# Patient Record
Sex: MALE | Race: OTHER | NOT HISPANIC OR LATINO
[De-identification: names, ages, dates, MRNs, and addresses within clinical notes are randomized per-mention and may not be internally consistent; named-entity substitution may affect disease eponyms.]

---

## 2018-06-14 ENCOUNTER — APPOINTMENT (OUTPATIENT)
Dept: INFECTIOUS DISEASE | Facility: HOSPITAL | Age: 53
End: 2018-06-14
Payer: SELF-PAY

## 2018-06-14 DIAGNOSIS — Z71.89 OTHER SPECIFIED COUNSELING: ICD-10-CM

## 2018-06-14 PROCEDURE — 99401 PREV MED CNSL INDIV APPRX 15: CPT | Mod: 25

## 2018-06-14 PROCEDURE — 90471 IMMUNIZATION ADMIN: CPT | Mod: NC

## 2018-06-14 PROCEDURE — 90717 YELLOW FEVER VACCINE SUBQ: CPT

## 2021-01-21 ENCOUNTER — APPOINTMENT (OUTPATIENT)
Dept: UROLOGY | Facility: CLINIC | Age: 56
End: 2021-01-21
Payer: COMMERCIAL

## 2021-01-21 VITALS
WEIGHT: 165 LBS | HEIGHT: 72 IN | TEMPERATURE: 97.2 F | BODY MASS INDEX: 22.35 KG/M2 | SYSTOLIC BLOOD PRESSURE: 133 MMHG | HEART RATE: 80 BPM | DIASTOLIC BLOOD PRESSURE: 90 MMHG

## 2021-01-21 DIAGNOSIS — Z87.442 PERSONAL HISTORY OF URINARY CALCULI: ICD-10-CM

## 2021-01-21 DIAGNOSIS — Z78.9 OTHER SPECIFIED HEALTH STATUS: ICD-10-CM

## 2021-01-21 PROCEDURE — 99072 ADDL SUPL MATRL&STAF TM PHE: CPT

## 2021-01-21 PROCEDURE — 99204 OFFICE O/P NEW MOD 45 MIN: CPT

## 2021-01-21 NOTE — PHYSICAL EXAM
[General Appearance - Well Developed] : well developed [General Appearance - Well Nourished] : well nourished [Normal Appearance] : normal appearance [Well Groomed] : well groomed [General Appearance - In No Acute Distress] : no acute distress [Edema] : no peripheral edema [Respiration, Rhythm And Depth] : normal respiratory rhythm and effort [Exaggerated Use Of Accessory Muscles For Inspiration] : no accessory muscle use [Abdomen Soft] : soft [Abdomen Tenderness] : non-tender [Costovertebral Angle Tenderness] : no ~M costovertebral angle tenderness [Urethral Meatus] : meatus normal [Urinary Bladder Findings] : the bladder was normal on palpation [Scrotum] : the scrotum was normal [No Prostate Nodules] : no prostate nodules [Testes Mass (___cm)] : there were no testicular masses [Normal Station and Gait] : the gait and station were normal for the patient's age [No Focal Deficits] : no focal deficits [] : no rash [Oriented To Time, Place, And Person] : oriented to person, place, and time [Affect] : the affect was normal [Mood] : the mood was normal [Not Anxious] : not anxious [No Palpable Adenopathy] : no palpable adenopathy

## 2021-01-21 NOTE — ASSESSMENT
[FreeTextEntry1] : We discussed hematuria today and the multiple potential reasons for its presence. I discussed both benign and malignant etiologies that may explain hematuria. I've also reviewed the workup it is generally recommended for evaluation of hematuria with the purposes of ruling out malignancy or other urinary tract pathology that could warrant intervention.\par I've explained that cystoscopy would be recommended and the reasons for it as well as the risks of bleeding infection and damage to urethra\par A catscan with and without contrast was ordered and discussed with patient\par Pt will follow up to review the at scan and for the cystoscopy\par

## 2021-01-21 NOTE — HISTORY OF PRESENT ILLNESS
[FreeTextEntry1] : cc blood in urine \par 54 yo male referred for eval blood in urine \par last week has had multiple episodes of gross hematuria with clots \par no dysuria \par nonsmoker \par h/o stones \par no fam h/o gu ca\par \par ua and renal sono normal

## 2021-01-21 NOTE — REVIEW OF SYSTEMS
[Blood in urine that you can see] : blood visible in urine [Negative] : Heme/Lymph [FreeTextEntry2] : blood in urine clots

## 2021-01-28 LAB
PSA SERPL-MCNC: 1.29 NG/ML
URINE CYTOLOGY: NORMAL

## 2021-02-05 ENCOUNTER — APPOINTMENT (OUTPATIENT)
Dept: UROLOGY | Facility: CLINIC | Age: 56
End: 2021-02-05
Payer: COMMERCIAL

## 2021-02-05 DIAGNOSIS — R31.0 GROSS HEMATURIA: ICD-10-CM

## 2021-02-05 PROCEDURE — 99072 ADDL SUPL MATRL&STAF TM PHE: CPT

## 2021-02-05 PROCEDURE — 52000 CYSTOURETHROSCOPY: CPT

## 2021-02-23 PROBLEM — R31.0 GROSS HEMATURIA: Status: ACTIVE | Noted: 2021-01-21

## 2023-07-29 ENCOUNTER — NON-APPOINTMENT (OUTPATIENT)
Age: 58
End: 2023-07-29

## 2023-07-30 ENCOUNTER — INPATIENT (INPATIENT)
Facility: HOSPITAL | Age: 58
LOS: 0 days | Discharge: HOME | DRG: 247 | End: 2023-07-31
Attending: INTERNAL MEDICINE | Admitting: INTERNAL MEDICINE
Payer: COMMERCIAL

## 2023-07-30 VITALS
DIASTOLIC BLOOD PRESSURE: 90 MMHG | RESPIRATION RATE: 20 BRPM | SYSTOLIC BLOOD PRESSURE: 147 MMHG | HEART RATE: 70 BPM | OXYGEN SATURATION: 98 %

## 2023-07-30 DIAGNOSIS — I21.3 ST ELEVATION (STEMI) MYOCARDIAL INFARCTION OF UNSPECIFIED SITE: ICD-10-CM

## 2023-07-30 LAB
ALBUMIN SERPL ELPH-MCNC: 4.1 G/DL — SIGNIFICANT CHANGE UP (ref 3.3–5)
ALBUMIN SERPL ELPH-MCNC: 4.2 G/DL — SIGNIFICANT CHANGE UP (ref 3.3–5)
ALBUMIN SERPL ELPH-MCNC: 4.3 G/DL — SIGNIFICANT CHANGE UP (ref 3.3–5)
ALBUMIN SERPL ELPH-MCNC: 4.4 G/DL — SIGNIFICANT CHANGE UP (ref 3.3–5)
ALP SERPL-CCNC: 50 U/L — SIGNIFICANT CHANGE UP (ref 40–120)
ALP SERPL-CCNC: 50 U/L — SIGNIFICANT CHANGE UP (ref 40–120)
ALP SERPL-CCNC: 53 U/L — SIGNIFICANT CHANGE UP (ref 40–120)
ALP SERPL-CCNC: 54 U/L — SIGNIFICANT CHANGE UP (ref 40–120)
ALT FLD-CCNC: 36 U/L — SIGNIFICANT CHANGE UP (ref 10–45)
ALT FLD-CCNC: 78 U/L — HIGH (ref 10–45)
ALT FLD-CCNC: 88 U/L — HIGH (ref 10–45)
ALT FLD-CCNC: 97 U/L — HIGH (ref 10–45)
ANION GAP SERPL CALC-SCNC: 12 MMOL/L — SIGNIFICANT CHANGE UP (ref 5–17)
ANION GAP SERPL CALC-SCNC: 14 MMOL/L — SIGNIFICANT CHANGE UP (ref 5–17)
ANION GAP SERPL CALC-SCNC: 14 MMOL/L — SIGNIFICANT CHANGE UP (ref 5–17)
ANION GAP SERPL CALC-SCNC: 15 MMOL/L — SIGNIFICANT CHANGE UP (ref 5–17)
APTT BLD: 28.2 SEC — SIGNIFICANT CHANGE UP (ref 24.5–35.6)
APTT BLD: 31 SEC — SIGNIFICANT CHANGE UP (ref 24.5–35.6)
AST SERPL-CCNC: 31 U/L — SIGNIFICANT CHANGE UP (ref 10–40)
AST SERPL-CCNC: 322 U/L — HIGH (ref 10–40)
AST SERPL-CCNC: 402 U/L — HIGH (ref 10–40)
AST SERPL-CCNC: 459 U/L — HIGH (ref 10–40)
BASOPHILS # BLD AUTO: 0.04 K/UL — SIGNIFICANT CHANGE UP (ref 0–0.2)
BASOPHILS # BLD AUTO: 0.07 K/UL — SIGNIFICANT CHANGE UP (ref 0–0.2)
BASOPHILS NFR BLD AUTO: 0.3 % — SIGNIFICANT CHANGE UP (ref 0–2)
BASOPHILS NFR BLD AUTO: 0.9 % — SIGNIFICANT CHANGE UP (ref 0–2)
BILIRUB SERPL-MCNC: 0.4 MG/DL — SIGNIFICANT CHANGE UP (ref 0.2–1.2)
BILIRUB SERPL-MCNC: 0.5 MG/DL — SIGNIFICANT CHANGE UP (ref 0.2–1.2)
BILIRUB SERPL-MCNC: 0.7 MG/DL — SIGNIFICANT CHANGE UP (ref 0.2–1.2)
BILIRUB SERPL-MCNC: 1 MG/DL — SIGNIFICANT CHANGE UP (ref 0.2–1.2)
BLD GP AB SCN SERPL QL: NEGATIVE — SIGNIFICANT CHANGE UP
BUN SERPL-MCNC: 12 MG/DL — SIGNIFICANT CHANGE UP (ref 7–23)
BUN SERPL-MCNC: 9 MG/DL — SIGNIFICANT CHANGE UP (ref 7–23)
BURR CELLS BLD QL SMEAR: SLIGHT — SIGNIFICANT CHANGE UP
CALCIUM SERPL-MCNC: 8.6 MG/DL — SIGNIFICANT CHANGE UP (ref 8.4–10.5)
CALCIUM SERPL-MCNC: 8.9 MG/DL — SIGNIFICANT CHANGE UP (ref 8.4–10.5)
CALCIUM SERPL-MCNC: 9.1 MG/DL — SIGNIFICANT CHANGE UP (ref 8.4–10.5)
CALCIUM SERPL-MCNC: 9.3 MG/DL — SIGNIFICANT CHANGE UP (ref 8.4–10.5)
CHLORIDE SERPL-SCNC: 100 MMOL/L — SIGNIFICANT CHANGE UP (ref 96–108)
CHLORIDE SERPL-SCNC: 100 MMOL/L — SIGNIFICANT CHANGE UP (ref 96–108)
CHLORIDE SERPL-SCNC: 101 MMOL/L — SIGNIFICANT CHANGE UP (ref 96–108)
CHLORIDE SERPL-SCNC: 106 MMOL/L — SIGNIFICANT CHANGE UP (ref 96–108)
CK MB BLD-MCNC: 12.7 % — HIGH (ref 0–3.5)
CK MB CFR SERPL CALC: 19.2 NG/ML — HIGH (ref 0–6.7)
CK MB CFR SERPL CALC: 600 NG/ML — HIGH (ref 0–6.7)
CK SERPL-CCNC: 4726 U/L — HIGH (ref 30–200)
CO2 SERPL-SCNC: 20 MMOL/L — LOW (ref 22–31)
CO2 SERPL-SCNC: 21 MMOL/L — LOW (ref 22–31)
CO2 SERPL-SCNC: 21 MMOL/L — LOW (ref 22–31)
CO2 SERPL-SCNC: 22 MMOL/L — SIGNIFICANT CHANGE UP (ref 22–31)
CREAT SERPL-MCNC: 0.7 MG/DL — SIGNIFICANT CHANGE UP (ref 0.5–1.3)
CREAT SERPL-MCNC: 0.71 MG/DL — SIGNIFICANT CHANGE UP (ref 0.5–1.3)
CREAT SERPL-MCNC: 0.73 MG/DL — SIGNIFICANT CHANGE UP (ref 0.5–1.3)
CREAT SERPL-MCNC: 0.86 MG/DL — SIGNIFICANT CHANGE UP (ref 0.5–1.3)
EGFR: 100 ML/MIN/1.73M2 — SIGNIFICANT CHANGE UP
EGFR: 105 ML/MIN/1.73M2 — SIGNIFICANT CHANGE UP
EGFR: 106 ML/MIN/1.73M2 — SIGNIFICANT CHANGE UP
EGFR: 106 ML/MIN/1.73M2 — SIGNIFICANT CHANGE UP
EOSINOPHIL # BLD AUTO: 0.01 K/UL — SIGNIFICANT CHANGE UP (ref 0–0.5)
EOSINOPHIL # BLD AUTO: 0.22 K/UL — SIGNIFICANT CHANGE UP (ref 0–0.5)
EOSINOPHIL NFR BLD AUTO: 0.1 % — SIGNIFICANT CHANGE UP (ref 0–6)
EOSINOPHIL NFR BLD AUTO: 2.7 % — SIGNIFICANT CHANGE UP (ref 0–6)
GLUCOSE SERPL-MCNC: 107 MG/DL — HIGH (ref 70–99)
GLUCOSE SERPL-MCNC: 120 MG/DL — HIGH (ref 70–99)
GLUCOSE SERPL-MCNC: 128 MG/DL — HIGH (ref 70–99)
GLUCOSE SERPL-MCNC: 93 MG/DL — SIGNIFICANT CHANGE UP (ref 70–99)
HCT VFR BLD CALC: 46.5 % — SIGNIFICANT CHANGE UP (ref 39–50)
HCT VFR BLD CALC: 46.7 % — SIGNIFICANT CHANGE UP (ref 39–50)
HGB BLD-MCNC: 15.4 G/DL — SIGNIFICANT CHANGE UP (ref 13–17)
HGB BLD-MCNC: 15.7 G/DL — SIGNIFICANT CHANGE UP (ref 13–17)
IMM GRANULOCYTES NFR BLD AUTO: 0.4 % — SIGNIFICANT CHANGE UP (ref 0–0.9)
INR BLD: 1.06 RATIO — SIGNIFICANT CHANGE UP (ref 0.85–1.18)
INR BLD: 1.07 RATIO — SIGNIFICANT CHANGE UP (ref 0.85–1.18)
LYMPHOCYTES # BLD AUTO: 1.63 K/UL — SIGNIFICANT CHANGE UP (ref 1–3.3)
LYMPHOCYTES # BLD AUTO: 14 % — SIGNIFICANT CHANGE UP (ref 13–44)
LYMPHOCYTES # BLD AUTO: 2.08 K/UL — SIGNIFICANT CHANGE UP (ref 1–3.3)
LYMPHOCYTES # BLD AUTO: 25.9 % — SIGNIFICANT CHANGE UP (ref 13–44)
MAGNESIUM SERPL-MCNC: 2 MG/DL — SIGNIFICANT CHANGE UP (ref 1.6–2.6)
MANUAL SMEAR VERIFICATION: SIGNIFICANT CHANGE UP
MCHC RBC-ENTMCNC: 28.9 PG — SIGNIFICANT CHANGE UP (ref 27–34)
MCHC RBC-ENTMCNC: 29 PG — SIGNIFICANT CHANGE UP (ref 27–34)
MCHC RBC-ENTMCNC: 33.1 GM/DL — SIGNIFICANT CHANGE UP (ref 32–36)
MCHC RBC-ENTMCNC: 33.6 GM/DL — SIGNIFICANT CHANGE UP (ref 32–36)
MCV RBC AUTO: 86.2 FL — SIGNIFICANT CHANGE UP (ref 80–100)
MCV RBC AUTO: 87.4 FL — SIGNIFICANT CHANGE UP (ref 80–100)
MONOCYTES # BLD AUTO: 0.67 K/UL — SIGNIFICANT CHANGE UP (ref 0–0.9)
MONOCYTES # BLD AUTO: 0.86 K/UL — SIGNIFICANT CHANGE UP (ref 0–0.9)
MONOCYTES NFR BLD AUTO: 10.7 % — SIGNIFICANT CHANGE UP (ref 2–14)
MONOCYTES NFR BLD AUTO: 5.7 % — SIGNIFICANT CHANGE UP (ref 2–14)
NEUTROPHILS # BLD AUTO: 4.8 K/UL — SIGNIFICANT CHANGE UP (ref 1.8–7.4)
NEUTROPHILS # BLD AUTO: 9.28 K/UL — HIGH (ref 1.8–7.4)
NEUTROPHILS NFR BLD AUTO: 59.8 % — SIGNIFICANT CHANGE UP (ref 43–77)
NEUTROPHILS NFR BLD AUTO: 79.5 % — HIGH (ref 43–77)
NRBC # BLD: 0 /100 WBCS — SIGNIFICANT CHANGE UP (ref 0–0)
NT-PROBNP SERPL-SCNC: <36 PG/ML — SIGNIFICANT CHANGE UP (ref 0–300)
PHOSPHATE SERPL-MCNC: 2 MG/DL — LOW (ref 2.5–4.5)
PHOSPHATE SERPL-MCNC: 2.6 MG/DL — SIGNIFICANT CHANGE UP (ref 2.5–4.5)
PHOSPHATE SERPL-MCNC: 2.7 MG/DL — SIGNIFICANT CHANGE UP (ref 2.5–4.5)
PLAT MORPH BLD: NORMAL — SIGNIFICANT CHANGE UP
PLATELET # BLD AUTO: 261 K/UL — SIGNIFICANT CHANGE UP (ref 150–400)
PLATELET # BLD AUTO: 271 K/UL — SIGNIFICANT CHANGE UP (ref 150–400)
POTASSIUM SERPL-MCNC: 3.8 MMOL/L — SIGNIFICANT CHANGE UP (ref 3.5–5.3)
POTASSIUM SERPL-MCNC: 4.3 MMOL/L — SIGNIFICANT CHANGE UP (ref 3.5–5.3)
POTASSIUM SERPL-MCNC: 4.4 MMOL/L — SIGNIFICANT CHANGE UP (ref 3.5–5.3)
POTASSIUM SERPL-MCNC: 5.2 MMOL/L — SIGNIFICANT CHANGE UP (ref 3.5–5.3)
POTASSIUM SERPL-SCNC: 3.8 MMOL/L — SIGNIFICANT CHANGE UP (ref 3.5–5.3)
POTASSIUM SERPL-SCNC: 4.3 MMOL/L — SIGNIFICANT CHANGE UP (ref 3.5–5.3)
POTASSIUM SERPL-SCNC: 4.4 MMOL/L — SIGNIFICANT CHANGE UP (ref 3.5–5.3)
POTASSIUM SERPL-SCNC: 5.2 MMOL/L — SIGNIFICANT CHANGE UP (ref 3.5–5.3)
PROT SERPL-MCNC: 7.1 G/DL — SIGNIFICANT CHANGE UP (ref 6–8.3)
PROT SERPL-MCNC: 7.2 G/DL — SIGNIFICANT CHANGE UP (ref 6–8.3)
PROT SERPL-MCNC: 7.2 G/DL — SIGNIFICANT CHANGE UP (ref 6–8.3)
PROT SERPL-MCNC: 7.3 G/DL — SIGNIFICANT CHANGE UP (ref 6–8.3)
PROTHROM AB SERPL-ACNC: 11.1 SEC — SIGNIFICANT CHANGE UP (ref 9.5–13)
PROTHROM AB SERPL-ACNC: 11.2 SEC — SIGNIFICANT CHANGE UP (ref 9.5–13)
RBC # BLD: 5.32 M/UL — SIGNIFICANT CHANGE UP (ref 4.2–5.8)
RBC # BLD: 5.42 M/UL — SIGNIFICANT CHANGE UP (ref 4.2–5.8)
RBC # FLD: 14.5 % — SIGNIFICANT CHANGE UP (ref 10.3–14.5)
RBC # FLD: 14.6 % — HIGH (ref 10.3–14.5)
RBC BLD AUTO: ABNORMAL
RH IG SCN BLD-IMP: POSITIVE — SIGNIFICANT CHANGE UP
SODIUM SERPL-SCNC: 135 MMOL/L — SIGNIFICANT CHANGE UP (ref 135–145)
SODIUM SERPL-SCNC: 135 MMOL/L — SIGNIFICANT CHANGE UP (ref 135–145)
SODIUM SERPL-SCNC: 136 MMOL/L — SIGNIFICANT CHANGE UP (ref 135–145)
SODIUM SERPL-SCNC: 140 MMOL/L — SIGNIFICANT CHANGE UP (ref 135–145)
TARGETS BLD QL SMEAR: SLIGHT — SIGNIFICANT CHANGE UP
TROPONIN T, HIGH SENSITIVITY RESULT: 38 NG/L — SIGNIFICANT CHANGE UP (ref 0–51)
TROPONIN T, HIGH SENSITIVITY RESULT: 6542 NG/L — HIGH (ref 0–51)
WBC # BLD: 11.68 K/UL — HIGH (ref 3.8–10.5)
WBC # BLD: 8.02 K/UL — SIGNIFICANT CHANGE UP (ref 3.8–10.5)
WBC # FLD AUTO: 11.68 K/UL — HIGH (ref 3.8–10.5)
WBC # FLD AUTO: 8.02 K/UL — SIGNIFICANT CHANGE UP (ref 3.8–10.5)

## 2023-07-30 PROCEDURE — 92978 ENDOLUMINL IVUS OCT C 1ST: CPT | Mod: 26,LD

## 2023-07-30 PROCEDURE — 93010 ELECTROCARDIOGRAM REPORT: CPT

## 2023-07-30 PROCEDURE — 71045 X-RAY EXAM CHEST 1 VIEW: CPT | Mod: 26

## 2023-07-30 PROCEDURE — 99285 EMERGENCY DEPT VISIT HI MDM: CPT

## 2023-07-30 PROCEDURE — 99152 MOD SED SAME PHYS/QHP 5/>YRS: CPT

## 2023-07-30 PROCEDURE — 93458 L HRT ARTERY/VENTRICLE ANGIO: CPT | Mod: 26,59

## 2023-07-30 PROCEDURE — 92941 PRQ TRLML REVSC TOT OCCL AMI: CPT | Mod: LD

## 2023-07-30 PROCEDURE — 99291 CRITICAL CARE FIRST HOUR: CPT

## 2023-07-30 RX ORDER — HEPARIN SODIUM 5000 [USP'U]/ML
4000 INJECTION INTRAVENOUS; SUBCUTANEOUS ONCE
Refills: 0 | Status: COMPLETED | OUTPATIENT
Start: 2023-07-30 | End: 2023-07-30

## 2023-07-30 RX ORDER — ATORVASTATIN CALCIUM 80 MG/1
80 TABLET, FILM COATED ORAL AT BEDTIME
Refills: 0 | Status: DISCONTINUED | OUTPATIENT
Start: 2023-07-30 | End: 2023-07-31

## 2023-07-30 RX ORDER — ASPIRIN/CALCIUM CARB/MAGNESIUM 324 MG
81 TABLET ORAL DAILY
Refills: 0 | Status: DISCONTINUED | OUTPATIENT
Start: 2023-07-31 | End: 2023-07-31

## 2023-07-30 RX ORDER — TICAGRELOR 90 MG/1
180 TABLET ORAL ONCE
Refills: 0 | Status: COMPLETED | OUTPATIENT
Start: 2023-07-30 | End: 2023-07-30

## 2023-07-30 RX ORDER — ACETAMINOPHEN 500 MG
1000 TABLET ORAL ONCE
Refills: 0 | Status: COMPLETED | OUTPATIENT
Start: 2023-07-30 | End: 2023-07-30

## 2023-07-30 RX ORDER — METOPROLOL TARTRATE 50 MG
25 TABLET ORAL
Refills: 0 | Status: DISCONTINUED | OUTPATIENT
Start: 2023-07-31 | End: 2023-07-31

## 2023-07-30 RX ORDER — HEPARIN SODIUM 5000 [USP'U]/ML
INJECTION INTRAVENOUS; SUBCUTANEOUS
Qty: 25000 | Refills: 0 | Status: DISCONTINUED | OUTPATIENT
Start: 2023-07-30 | End: 2023-07-30

## 2023-07-30 RX ORDER — METOPROLOL TARTRATE 50 MG
12.5 TABLET ORAL ONCE
Refills: 0 | Status: COMPLETED | OUTPATIENT
Start: 2023-07-30 | End: 2023-07-30

## 2023-07-30 RX ORDER — AMIODARONE HYDROCHLORIDE 400 MG/1
150 TABLET ORAL ONCE
Refills: 0 | Status: COMPLETED | OUTPATIENT
Start: 2023-07-30 | End: 2023-07-30

## 2023-07-30 RX ORDER — CHLORHEXIDINE GLUCONATE 213 G/1000ML
1 SOLUTION TOPICAL
Refills: 0 | Status: DISCONTINUED | OUTPATIENT
Start: 2023-07-30 | End: 2023-07-31

## 2023-07-30 RX ORDER — TICAGRELOR 90 MG/1
90 TABLET ORAL ONCE
Refills: 0 | Status: COMPLETED | OUTPATIENT
Start: 2023-07-30 | End: 2023-07-30

## 2023-07-30 RX ORDER — METOPROLOL TARTRATE 50 MG
12.5 TABLET ORAL
Refills: 0 | Status: DISCONTINUED | OUTPATIENT
Start: 2023-07-30 | End: 2023-07-30

## 2023-07-30 RX ORDER — HEPARIN SODIUM 5000 [USP'U]/ML
4000 INJECTION INTRAVENOUS; SUBCUTANEOUS EVERY 6 HOURS
Refills: 0 | Status: DISCONTINUED | OUTPATIENT
Start: 2023-07-30 | End: 2023-07-30

## 2023-07-30 RX ORDER — TICAGRELOR 90 MG/1
90 TABLET ORAL EVERY 12 HOURS
Refills: 0 | Status: DISCONTINUED | OUTPATIENT
Start: 2023-07-30 | End: 2023-07-31

## 2023-07-30 RX ADMIN — Medication 12.5 MILLIGRAM(S): at 14:10

## 2023-07-30 RX ADMIN — ATORVASTATIN CALCIUM 80 MILLIGRAM(S): 80 TABLET, FILM COATED ORAL at 21:30

## 2023-07-30 RX ADMIN — TICAGRELOR 180 MILLIGRAM(S): 90 TABLET ORAL at 11:30

## 2023-07-30 RX ADMIN — Medication 1000 MILLIGRAM(S): at 14:45

## 2023-07-30 RX ADMIN — Medication 12.5 MILLIGRAM(S): at 15:47

## 2023-07-30 RX ADMIN — Medication 63.75 MILLIMOLE(S): at 23:06

## 2023-07-30 RX ADMIN — Medication 400 MILLIGRAM(S): at 14:16

## 2023-07-30 RX ADMIN — TICAGRELOR 90 MILLIGRAM(S): 90 TABLET ORAL at 19:55

## 2023-07-30 RX ADMIN — HEPARIN SODIUM 4000 UNIT(S): 5000 INJECTION INTRAVENOUS; SUBCUTANEOUS at 11:28

## 2023-07-30 RX ADMIN — HEPARIN SODIUM 950 UNIT(S)/HR: 5000 INJECTION INTRAVENOUS; SUBCUTANEOUS at 11:29

## 2023-07-30 RX ADMIN — TICAGRELOR 90 MILLIGRAM(S): 90 TABLET ORAL at 17:33

## 2023-07-30 RX ADMIN — AMIODARONE HYDROCHLORIDE 600 MILLIGRAM(S): 400 TABLET ORAL at 18:11

## 2023-07-30 NOTE — ED PROVIDER NOTE - ATTENDING CONTRIBUTION TO CARE
attending Em: 57yM no PMH presents as an EMS pre-notification for STEMI. Pt with chest pain x2 hours after playing tennis. Located across central chest, assoc with nausea and one episode of vomiting. Seen at urgent care with dynamic EKG changes, EKG with EMS concerning for anterior stemi. Given full dose ASA and 1 nitroglycerine by EMS. On arrival, repeat ekg performed, pt placed on tele, labs sent, cardiology consulted for urgent cath lab, pt given Brilinta and heparin as per ACS nomogram

## 2023-07-30 NOTE — H&P ADULT - NSHPREVIEWOFSYSTEMS_GEN_ALL_CORE
REVIEW OF SYSTEMS:    CONSTITUTIONAL: No weakness, fevers or chills  EYES/ENT: No visual changes;  No vertigo or throat pain   NECK: No pain or stiffness  RESPIRATORY: No cough, wheezing, hemoptysis; No shortness of breath  CARDIOVASCULAR: Chest pain improving, currently 2-3/10, See HPI No palpitations  GASTROINTESTINAL: See HPI No abdominal or epigastric pain. currently No nausea, vomiting, or hematemesis; No diarrhea or constipation. No melena or hematochezia.  GENITOURINARY: No dysuria, frequency or hematuria  NEUROLOGICAL: No numbness or weakness  SKIN: No itching, burning, rashes, or lesions   All other review of systems is negative unless indicated above.

## 2023-07-30 NOTE — H&P ADULT - HISTORY OF PRESENT ILLNESS
57M PMH HLD (no meds, last total chol 230s), presents with chest pain starting this morning around 9am. Patient states that he was playing tennis for 1.5 hours this morning, then went home, was nauseous with two episodes of non-bilious nonbloody vomiting. States the chest pain continued, described as burning sensation across left & right chest, rated 6/10, with radiation to both armpits, After discussing with his wife, he went to urgent care, was found to have MITRA on EKG, and was sent to the ED.    In ED was found with MITRA in inferior and precordial leads, was heparin, ASA, and brilinta loaded and taken for LHC    In cath lab found with 100% pLAD which patient received thrombectomy and DESx1

## 2023-07-30 NOTE — ED ADULT NURSE NOTE - CAS EDN DISCHARGE ASSESSMENT
Chest pain unchanged 4/10 since arrival/Alert and oriented to person, place and time/Patient baseline mental status

## 2023-07-30 NOTE — H&P ADULT - NSHPSOCIALHISTORY_GEN_ALL_CORE
Patient lives at home with wife, no issues with ADLs. 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 Denies any current or past drug use including marijuana and cocaine. Denies any current or past tobacco use. States drinks socially approx 1 monthly 1-2drinks.

## 2023-07-30 NOTE — PROGRESS NOTE ADULT - ASSESSMENT
====================ASSESSMENT ==============  57M PMH HLD p/w CP found with STEMI s/p LHC with JIMMIE x1 and thrombectomy to pLAD      Plan:  ====================== NEUROLOGY=====================  A&Ox3  - continue to monitor mental status as per protocol     ==================== RESPIRATORY======================  Comfortable on RA  - continue to monitor SpO2 with goal >94%    ====================CARDIOVASCULAR==================  AWSTEMI  - s/p LHC RRA 7/30: pLAD 100% s/p JIMMIE x1 with thrombectomy. LVEDP 30  - TTE ordered  - trend CE until peak  - ordered Tylenol for residual CP, continue to monitor for resolution   - start lopressor as freq AIVR on tele. HD stable and asymptomatic. Uptitrate as needed   - c/w DAPT: ASA and brilinta. Send Brilinta to pharmacy to verify insurance coverage and copayment   - start high intensity statin  - Consider ARB if tolerated for GDMT if SCr stable s.p cath   - monitor RRA access site       ===================HEMATOLOGIC/ONC ===================  CBC wnl   - Monitor H/H and plts  - DVT PPX:  start SQH 4hrs s/p radial band removal     ===================== RENAL =========================  SCr wnl   - Continue monitoring urine output, lytes, SCr/ BUN  - replete lytes prn with goal K >4 and Mg >2    ==================== GASTROINTESTINAL===================  DASH diet as tolerated  - monitor for regular BM while inpatient   =======================    ENDOCRINE  =====================  - send TSH, lipid, and hgb a1c    atorvastatin 80 milliGRAM(s) Oral at bedtime    ========================INFECTIOUS DISEASE================  Afebrile, no leukocytosis   - monitor and trend WBC and temperature curve        ====================ASSESSMENT ==============  57M PMH HLD p/w CP found with STEMI s/p LHC with JIMMIE x1 and thrombectomy to pLAD      Plan:  ====================== NEUROLOGY=====================  A&Ox3  - continue to monitor mental status as per protocol     ==================== RESPIRATORY======================  Comfortable on RA  - continue to monitor SpO2 with goal >94%    ====================CARDIOVASCULAR==================  AWSTEMI  - s/p LHC RRA 7/30: pLAD 100% s/p JIMMIE x1 with thrombectomy. LVEDP 30  - TTE ordered  - trend CE until peak  - ordered Tylenol for residual CP, continue to monitor for resolution   - start lopressor as freq AIVR on tele. HD stable and asymptomatic. Uptitrate as needed   - c/w DAPT: ASA and brilinta. Send Brilinta to pharmacy to verify insurance coverage and copayment   - start high intensity statin  - Consider ARB if tolerated for GDMT if SCr stable s.p cath   - monitor RRA access site       ===================HEMATOLOGIC/ONC ===================  CBC wnl   - Monitor H/H and plts  - DVT PPX:  start SQH 4hrs s/p radial band removal     ===================== RENAL =========================  SCr wnl   - Continue monitoring urine output, lytes, SCr/ BUN  - replete lytes prn with goal K >4 and Mg >2    ==================== GASTROINTESTINAL===================  DASH diet as tolerated  - monitor for regular BM while inpatient   =======================    ENDOCRINE  =====================  - send TSH, lipid, and hgb a1c    atorvastatin 80 milliGRAM(s) Oral at bedtime    ========================INFECTIOUS DISEASE================  Afebrile, no leukocytosis   - monitor and trend WBC and temperature curve       Patient requires continuous monitoring with bedside rhythm monitoring, pulse ox monitoring, and intermittent blood gas analysis. Care plan discussed with ICU care team. Patient remained critical and at risk for life threatening decompensation.  Patient seen, examined and plan discussed with CCU team during rounds.     I have personally provided 35 minutes of critical care time excluding time spent on separate procedures, in addition to initial critical care time provided by the CICU Attending, Dr. De Paz

## 2023-07-30 NOTE — H&P ADULT - NSHPPHYSICALEXAM_GEN_ALL_CORE
PHYSICAL EXAM:  Constitutional: Patient laying in bed, NAD  Head: Atraumatic, normocephalic  Eyes: No scleral icterus. PERRLA. EOMI  ENMT: Moist mucous membrane. Uvula midline  Neck: Supple, No JVD  Respiratory: CTA B/L. No wheezes, rales or rhonchi   Cardiovascular: Tachycardic S1/S2. No murmurs, rubs, or gallops.  Gastrointestinal: Nondistended, BSx4, soft, nontender.  Extremities: Moves all extremities. Warm, no edema, nontender  Vascular: 2+ DP/PT pulses B/L   Neurological: A&Ox3. Follows commands. No focal deficits.   Skin: No rashes  on exposed skin

## 2023-07-30 NOTE — ED PROVIDER NOTE - PHYSICAL EXAMINATION
PHYSICAL EXAM:  CONSTITUTIONAL: Uncomfortable appearing, pleasant, awake, alert, oriented to person, place, time/situation and in no apparent distress.  HEAD: Atraumatic, no step offs.  NECK: Supple, no meningismus.   EYES: Clear bilaterally, pupils equal, round and reactive to light.  ENMT: Airway patent, Nasal mucosa clear. Mouth with normal mucosa. Uvula is midline.   CARDIAC: Normal rate, regular rhythm. +S1/S2. No murmurs, rubs or gallops. 2+ pulses equal bilateral radial.  RESPIRATORY: Breathing unlabored. Breath sounds clear and equal bilaterally.  NEUROLOGICAL: Alert and oriented, no focal deficits, no motor or sensory deficits.   MSK: No clubbing, cyanosis, or edema. Full range of motion of all extremities.   SKIN: Skin warm and dry. No evidence of rashes or lesions.

## 2023-07-30 NOTE — ED ADULT NURSE NOTE - OBJECTIVE STATEMENT
Pt 49 y/o male, AxOX3, presents to ED via EMS from urgent care for ischemic changes on EKG. No pertinent PMH, not on AC. Pt reporting playing tennis this am, followed by episode of emesis and left sided chest burning. PT presented to urgent care, EKG was completed and pt prompted to be seen in ED. Pt received 324mg ASA, 1 SL Nitro in route. Arrived to ED awake, rating pain at 3/10. Pt is well appearing, speaking full sentences without difficulty. Breathing spontaneous and unlabored. Cardiology MD at bedside. Pt placed on continuous pulse ox and cardiac monitor, placed on pads. Safety and comfort measures initiated- bed placed in lowest position and side rails raised. Pt oriented to call bell system.

## 2023-07-30 NOTE — H&P ADULT - NSICDXFAMILYHX_GEN_ALL_CORE_FT
FAMILY HISTORY:  Father  Still living? No  Family history of CVA, Age at diagnosis: Age Unknown  FH: CAD (coronary artery disease), Age at diagnosis: Age Unknown    Sibling  Still living? Unknown  FH: HTN (hypertension), Age at diagnosis: Age Unknown

## 2023-07-30 NOTE — PROGRESS NOTE ADULT - SUBJECTIVE AND OBJECTIVE BOX
YESSICA GUILLEN  MRN-[id number]  Patient is a 57y old  Male who presents with a chief complaint of STEMI (30 Jul 2023 22:53)    HPI:  57M PMH HLD (no meds, last total chol 230s), presents with chest pain starting this morning around 9am. Patient states that he was playing tennis for 1.5 hours this morning, then went home, was nauseous with two episodes of non-bilious nonbloody vomiting. States the chest pain continued, described as burning sensation across left & right chest, rated 6/10, with radiation to both armpits, After discussing with his wife, he went to urgent care, was found to have MITRA on EKG, and was sent to the ED.    In ED was found with MITRA in inferior and precordial leads, was heparin, ASA, and brilinta loaded and taken for LHC    In cath lab found with 100% pLAD which patient received thrombectomy and DESx1  (30 Jul 2023 13:55)      24 HOUR EVENTS:  - EKG with resolved MITRA after cath    REVIEW OF SYSTEMS:  CONSTITUTIONAL: No weakness, fevers or chills  EYES/ENT: No visual changes;  No vertigo or throat pain   NECK: No pain or stiffness  RESPIRATORY: No cough, wheezing, hemoptysis; No shortness of breath  CARDIOVASCULAR: No chest pain or palpitations  GASTROINTESTINAL: No abdominal or epigastric pain. No nausea, vomiting, or hematemesis; No diarrhea or constipation. No melena or hematochezia.  GENITOURINARY: No dysuria, frequency or hematuria  NEUROLOGICAL: No numbness or weakness  SKIN: No itching, rashes      ICU Vital Signs Last 24 Hrs  T(C): 36.6 (31 Jul 2023 00:00), Max: 37.4 (30 Jul 2023 13:08)  T(F): 97.8 (31 Jul 2023 00:00), Max: 99.3 (30 Jul 2023 13:08)  HR: 80 (31 Jul 2023 00:00) (70 - 130)  BP: 122/72 (31 Jul 2023 00:00) (82/65 - 147/90)  BP(mean): 90 (31 Jul 2023 00:00) (70 - 99)  ABP: --  ABP(mean): --  RR: 26 (31 Jul 2023 00:00) (15 - 31)  SpO2: 97% (31 Jul 2023 00:00) (94% - 100%)    O2 Parameters below as of 31 Jul 2023 00:00  Patient On (Oxygen Delivery Method): room air            CVP(mm Hg): --  CO: --  CI: --  PA: --  PA(mean): --  PA(direct): --  PCWP: --  LA: --  RA: --  SVR: --  SVRI: --  PVR: --  PVRI: --  I&O's Summary    30 Jul 2023 07:01  -  31 Jul 2023 02:09  --------------------------------------------------------  IN: 557.5 mL / OUT: 1200 mL / NET: -642.5 mL        CAPILLARY BLOOD GLUCOSE    CAPILLARY BLOOD GLUCOSE          PHYSICAL EXAM:  GENERAL: No acute distress, well-developed  HEAD:  Atraumatic, Normocephalic  EYES: EOMI, PERRLA, conjunctiva and sclera clear  NECK: Supple, no lymphadenopathy, no JVD  CHEST/LUNG: CTAB; No wheezes, rales, or rhonchi  HEART: Regular rate and rhythm. Normal S1/S2. No murmurs, rubs, or gallops  ABDOMEN: Soft, non-tender, non-distended; normal bowel sounds, no organomegaly  EXTREMITIES:  2+ peripheral pulses b/l, No clubbing, cyanosis, or edema  NEUROLOGY: A&O x 3, no focal deficits  SKIN: No rashes or lesions    ============================I/O===========================   I&O's Detail    30 Jul 2023 07:01  -  31 Jul 2023 02:09  --------------------------------------------------------  IN:    IV PiggyBack: 287.5 mL    Oral Fluid: 270 mL  Total IN: 557.5 mL    OUT:    Emesis (mL): 300 mL    Voided (mL): 900 mL  Total OUT: 1200 mL    Total NET: -642.5 mL        ============================ LABS =========================                        15.7   11.68 )-----------( 261      ( 30 Jul 2023 17:03 )             46.7     07-30    135  |  100  |  9   ----------------------------<  120<H>  4.3   |  21<L>  |  0.70    Ca    9.1      30 Jul 2023 23:25  Phos  2.6     07-30  Mg     2.0     07-30    TPro  7.2  /  Alb  4.1  /  TBili  0.7  /  DBili  x   /  AST  459<H>  /  ALT  97<H>  /  AlkPhos  54  07-30    Troponin T, High Sensitivity Result: 7616 ng/L (07-30-23 @ 23:25)  Troponin T, High Sensitivity Result: 6542 ng/L (07-30-23 @ 17:03)  Troponin T, High Sensitivity Result: 38 ng/L (07-30-23 @ 11:27)    CKMB Units: 600.0 ng/mL (07-30-23 @ 23:25)  CKMB Units: 600.0 ng/mL (07-30-23 @ 17:03)  CKMB Units: 19.2 ng/mL (07-30-23 @ 11:27)    Creatine Kinase, Serum: 5687 U/L (07-30-23 @ 23:25)  Creatine Kinase, Serum: 4726 U/L (07-30-23 @ 17:03)    CPK Mass Assay %: 10.6 % (07-30-23 @ 23:25)  CPK Mass Assay %: 12.7 % (07-30-23 @ 17:03)        LIVER FUNCTIONS - ( 30 Jul 2023 23:25 )  Alb: 4.1 g/dL / Pro: 7.2 g/dL / ALK PHOS: 54 U/L / ALT: 97 U/L / AST: 459 U/L / GGT: x           PT/INR - ( 30 Jul 2023 17:03 )   PT: 11.1 sec;   INR: 1.06 ratio         PTT - ( 30 Jul 2023 17:03 )  PTT:31.0 sec      Urinalysis Basic - ( 30 Jul 2023 23:25 )    Color: x / Appearance: x / SG: x / pH: x  Gluc: 120 mg/dL / Ketone: x  / Bili: x / Urobili: x   Blood: x / Protein: x / Nitrite: x   Leuk Esterase: x / RBC: x / WBC x   Sq Epi: x / Non Sq Epi: x / Bacteria: x      ======================Micro/Rad/Cardio=================  Telemetry: Reviewed   EKG: Reviewed  CXR: Reviewed  Culture: Reviewed   Echo:   Cath:           YESSICA GUILLEN  MRN-[id number]  Patient is a 57y old  Male who presents with a chief complaint of STEMI (30 Jul 2023 22:53)    HPI:  57M PMH HLD (no meds, last total chol 230s), presents with chest pain starting this morning around 9am. Patient states that he was playing tennis for 1.5 hours this morning, then went home, was nauseous with two episodes of non-bilious nonbloody vomiting. States the chest pain continued, described as burning sensation across left & right chest, rated 6/10, with radiation to both armpits, After discussing with his wife, he went to urgent care, was found to have MITRA on EKG, and was sent to the ED.    In ED was found with MITRA in inferior and precordial leads, was heparin, ASA, and brilinta loaded and taken for LHC    In cath lab found with 100% pLAD which patient received thrombectomy and DESx1  (30 Jul 2023 13:55)      24 HOUR EVENTS:  - EKG with resolved MITRA after cath    REVIEW OF SYSTEMS:  CONSTITUTIONAL: No weakness, fevers or chills  EYES/ENT: No visual changes;  No vertigo or throat pain   NECK: No pain or stiffness  RESPIRATORY: No cough, wheezing, hemoptysis; No shortness of breath  CARDIOVASCULAR: No chest pain or palpitations  GASTROINTESTINAL: No abdominal or epigastric pain. No nausea, vomiting, or hematemesis; No diarrhea or constipation. No melena or hematochezia.  GENITOURINARY: No dysuria, frequency or hematuria  NEUROLOGICAL: No numbness or weakness  SKIN: No itching, rashes      ICU Vital Signs Last 24 Hrs  T(C): 36.6 (31 Jul 2023 00:00), Max: 37.4 (30 Jul 2023 13:08)  T(F): 97.8 (31 Jul 2023 00:00), Max: 99.3 (30 Jul 2023 13:08)  HR: 80 (31 Jul 2023 00:00) (70 - 130)  BP: 122/72 (31 Jul 2023 00:00) (82/65 - 147/90)  BP(mean): 90 (31 Jul 2023 00:00) (70 - 99)  ABP: --  ABP(mean): --  RR: 26 (31 Jul 2023 00:00) (15 - 31)  SpO2: 97% (31 Jul 2023 00:00) (94% - 100%)    O2 Parameters below as of 31 Jul 2023 00:00  Patient On (Oxygen Delivery Method): room air            CVP(mm Hg): --  CO: --  CI: --  PA: --  PA(mean): --  PA(direct): --  PCWP: --  LA: --  RA: --  SVR: --  SVRI: --  PVR: --  PVRI: --  I&O's Summary    30 Jul 2023 07:01  -  31 Jul 2023 02:09  --------------------------------------------------------  IN: 557.5 mL / OUT: 1200 mL / NET: -642.5 mL        CAPILLARY BLOOD GLUCOSE    CAPILLARY BLOOD GLUCOSE          PHYSICAL EXAM:  GENERAL: No acute distress, well-developed  HEAD:  Atraumatic, Normocephalic  EYES: EOMI, PERRLA, conjunctiva and sclera clear  NECK: Supple, no lymphadenopathy, no JVD  CHEST/LUNG: CTAB; No wheezes, rales, or rhonchi  HEART: Regular rate and rhythm. Normal S1/S2. No murmurs, rubs, or gallops  ABDOMEN: Soft, non-tender, non-distended; normal bowel sounds, no organomegaly  EXTREMITIES:  2+ peripheral pulses b/l, No clubbing, cyanosis, or edema. No evidence of hematoma at right radial site  NEUROLOGY: A&O x 3, no focal deficits  SKIN: No rashes or lesions    ============================I/O===========================   I&O's Detail    30 Jul 2023 07:01  -  31 Jul 2023 02:09  --------------------------------------------------------  IN:    IV PiggyBack: 287.5 mL    Oral Fluid: 270 mL  Total IN: 557.5 mL    OUT:    Emesis (mL): 300 mL    Voided (mL): 900 mL  Total OUT: 1200 mL    Total NET: -642.5 mL        ============================ LABS =========================                        15.7   11.68 )-----------( 261      ( 30 Jul 2023 17:03 )             46.7     07-30    135  |  100  |  9   ----------------------------<  120<H>  4.3   |  21<L>  |  0.70    Ca    9.1      30 Jul 2023 23:25  Phos  2.6     07-30  Mg     2.0     07-30    TPro  7.2  /  Alb  4.1  /  TBili  0.7  /  DBili  x   /  AST  459<H>  /  ALT  97<H>  /  AlkPhos  54  07-30    Troponin T, High Sensitivity Result: 7616 ng/L (07-30-23 @ 23:25)  Troponin T, High Sensitivity Result: 6542 ng/L (07-30-23 @ 17:03)  Troponin T, High Sensitivity Result: 38 ng/L (07-30-23 @ 11:27)    CKMB Units: 600.0 ng/mL (07-30-23 @ 23:25)  CKMB Units: 600.0 ng/mL (07-30-23 @ 17:03)  CKMB Units: 19.2 ng/mL (07-30-23 @ 11:27)    Creatine Kinase, Serum: 5687 U/L (07-30-23 @ 23:25)  Creatine Kinase, Serum: 4726 U/L (07-30-23 @ 17:03)    CPK Mass Assay %: 10.6 % (07-30-23 @ 23:25)  CPK Mass Assay %: 12.7 % (07-30-23 @ 17:03)        LIVER FUNCTIONS - ( 30 Jul 2023 23:25 )  Alb: 4.1 g/dL / Pro: 7.2 g/dL / ALK PHOS: 54 U/L / ALT: 97 U/L / AST: 459 U/L / GGT: x           PT/INR - ( 30 Jul 2023 17:03 )   PT: 11.1 sec;   INR: 1.06 ratio         PTT - ( 30 Jul 2023 17:03 )  PTT:31.0 sec      Urinalysis Basic - ( 30 Jul 2023 23:25 )    Color: x / Appearance: x / SG: x / pH: x  Gluc: 120 mg/dL / Ketone: x  / Bili: x / Urobili: x   Blood: x / Protein: x / Nitrite: x   Leuk Esterase: x / RBC: x / WBC x   Sq Epi: x / Non Sq Epi: x / Bacteria: x      ======================Micro/Rad/Cardio=================  Telemetry: Reviewed   EKG: Reviewed  CXR: Reviewed  Culture: Reviewed   Echo:   Cath:

## 2023-07-30 NOTE — H&P ADULT - ATTENDING COMMENTS
Anterior wall STEMI, now status post emergent PCI  Elevated troponin    Continue dual antiplatelet therapy  High intensity statin therapy  TTE pending  Guideline directed medical therapy

## 2023-07-30 NOTE — ED ADULT NURSE NOTE - NS ED NURSE TRANSPORT WITH
tele box, pads, RN ED TECH and Cardiology RN/Cardiac Monitor/Defib/ACLS/Rescue Kit/O2/BVM/IV pump/pulse ox/ACLS Rescue Kit

## 2023-07-30 NOTE — ED PROVIDER NOTE - OBJECTIVE STATEMENT
57-year-old male with no significant past medical history, presents as a STEMI notification.  Patient reports that he was playing tennis at 9 AM when he suddenly started having pain across his central chest, nausea, And 1 episode of emesis.  Patient had persistent pain, was picked up a wife and taken to urgent care which showed dynamic changes on EKG with elevations in anterior leads and depression in aVR.  Received 4 aspirin by EMS and 1 nitroglycerin with some improvement of symptoms.  Denies fever chills recent URI, shortness of breath, abdominal pain, diarrhea, numbness, tingling, weakness in extremities  Not on anticoagulation. Accompanied by wife. Non smoker, no alcohol use, no drugs. Has family history of cardiac disease with father who had MI at age 59.     PCP: Dr. Kraft.

## 2023-07-30 NOTE — PATIENT PROFILE ADULT - NSPROGENOTHERPROVIDER_GEN_A_NUR
Ora Gonzalez, 84 y.o., female arrived ambulatory to clinic at 1309 for Nplate injection. Patient assessed and vital signs stable. Administered Nplate SQ into R arm per provider order. Pt tolerated injection well. Ora Gonzalez verbalized understanding of plan of care and return to clinic. Discharged to [facility] at 1341 alert and ambulatory.   none

## 2023-07-30 NOTE — ED ADULT NURSE NOTE - NSFALLUNIVINTERV_ED_ALL_ED
Assistance with ambulation/Bed/Stretcher in lowest position, wheels locked, appropriate side rails in place/Call bell, personal items and telephone in reach/Instruct patient to call for assistance before getting out of bed/chair/stretcher/Non-slip footwear applied when patient is off stretcher/Reading to call system/Physically safe environment - no spills, clutter or unnecessary equipment/Purposeful proactive rounding/Room/bathroom lighting operational, light cord in reach

## 2023-07-30 NOTE — CHART NOTE - NSCHARTNOTEFT_GEN_A_CORE
Registration Time:  Initial ECG:  Called by ED:  Saw patient at bedside:  Called Cath Attending:  Balloon/device time:    Patient seen and evaluated at bedside    Reason for consult: STEMI    HPI:  57 No prior medical history, presents with two days of chest pain . pt states that he was playing tennis for 1.5 hours this morning, then went home, was nauseous, started having chest pain the is in the enter of his chest radiating to left arm, so he went to urgent care, was found to have MITRA on EKG, and was sent to the ED. Pt reports on going chest pain 4/10, burning in nature. No shortness of breath, lightheadedness, dizziness, orthopnea, PND, BYERS, or LE edema. He has no prior cardiac history, and does not smoke, His father had an MI in his 50's.       PMHx:   No pertinent past medical history        PSHx:   No significant past surgical history        Home Meds:    Current Meds:   heparin   Injectable 4000 Unit(s) IV Push every 6 hours PRN  heparin  Infusion.  Unit(s)/Hr IV Continuous <Continuous>      Allergies:  penicillin (Unknown)      FAMILY HISTORY:      Social History:  Smoking History:denies  Alcohol Use:social  Drug Use:denies    Review of Systems:  REVIEW OF SYSTEMS:  CONSTITUTIONAL: No weakness, fevers or chills  EYES/ENT: No visual changes;  No dysphagia  NECK: No pain or stiffness  RESPIRATORY: No cough, wheezing, hemoptysis; No shortness of breath  CARDIOVASCULAR: No chest pain or palpitations; No lower extremity edema  GASTROINTESTINAL: No abdominal or epigastric pain. No nausea, vomiting, or hematemesis; No diarrhea or constipation. No melena or hematochezia.  BACK: No back pain  GENITOURINARY: No dysuria, frequency or hematuria  NEUROLOGICAL: No numbness or weakness  SKIN: No itching, burning, rashes, or lesions   All other review of systems is negative unless indicated above.    Physical Exam:  T(F): 98 (07-30), Max: 98 (07-30)  HR: 72 (07-30) (70 - 72)  BP: 134/94 (07-30) (134/94 - 147/90)  RR: 18 (07-30)  SpO2: 99% (07-30)  GENERAL: No acute distress, well-developed  HEAD:  Atraumatic, Normocephalic  ENT: EOMI, PERRLA, conjunctiva and sclera clear, Neck supple, No JVD, moist mucosa  CHEST/LUNG: Clear to auscultation bilaterally; No wheeze, equal breath sounds bilaterally   BACK: No spinal tenderness  HEART: Regular rate and rhythm; No murmurs, rubs, or gallops  ABDOMEN: Soft, Nontender, Nondistended; Bowel sounds present  EXTREMITIES:  No clubbing, cyanosis, or edema  PSYCH: Nl behavior, nl affect  NEUROLOGY: AAOx3, non-focal, cranial nerves intact  SKIN: Normal color, No rashes or lesions  LINES:    Cardiovascular Diagnostic Testing:    ECG: Personally reviewed    Echo:    Stress Testing:    Cath:    Imaging:    Labs: Personally reviewed                        15.4   8.02  )-----------( 271      ( 30 Jul 2023 11:27 )             46.5           PT/INR - ( 30 Jul 2023 11:27 )   PT: 11.2 sec;   INR: 1.07 ratio         PTT - ( 30 Jul 2023 11:27 )  PTT:28.2 sec Registration Time:  Initial ECG: MITRA in V2-V6, II, III, aVF sub mm in I  Called by ED: 1115am  Saw patient at bedside:1118.  Called Cath Attendin  Balloon/device time:    Patient seen and evaluated at bedside    Reason for consult: STEMI    HPI:  57 No prior medical history, presents with two days of chest pain . pt states that he was playing tennis for 1.5 hours this morning, then went home, was nauseous, started having chest pain the is in the enter of his chest radiating to left arm, so he went to urgent care, was found to have MITRA on EKG, and was sent to the ED. Pt reports on going chest pain 4/10, burning in nature. No shortness of breath, lightheadedness, dizziness, orthopnea, PND, BYERS, or LE edema. He has no prior cardiac history, and does not smoke, His father had an MI in his 50's.       PMHx:   No pertinent past medical history        PSHx:   No significant past surgical history        Home Meds:    Current Meds:   heparin   Injectable 4000 Unit(s) IV Push every 6 hours PRN  heparin  Infusion.  Unit(s)/Hr IV Continuous <Continuous>      Allergies:  penicillin (Unknown)      FAMILY HISTORY:      Social History:  Smoking History:denies  Alcohol Use:social  Drug Use:denies    Review of Systems:  REVIEW OF SYSTEMS:  CONSTITUTIONAL: No weakness, fevers or chills  EYES/ENT: No visual changes;  No dysphagia  NECK: No pain or stiffness  RESPIRATORY: No cough, wheezing, hemoptysis; No shortness of breath  CARDIOVASCULAR: No chest pain or palpitations; No lower extremity edema  GASTROINTESTINAL: No abdominal or epigastric pain. No nausea, vomiting, or hematemesis; No diarrhea or constipation. No melena or hematochezia.  BACK: No back pain  GENITOURINARY: No dysuria, frequency or hematuria  NEUROLOGICAL: No numbness or weakness  SKIN: No itching, burning, rashes, or lesions   All other review of systems is negative unless indicated above.    Physical Exam:  T(F): 98 (-), Max: 98 ()  HR: 72 () (70 - 72)  BP: 134/94 () (134/94 - 147/90)  RR: 18 (-)  SpO2: 99% ()  GENERAL: No acute distress, well-developed  HEAD:  Atraumatic, Normocephalic  ENT: EOMI, PERRLA, conjunctiva and sclera clear, Neck supple, No JVD, moist mucosa  CHEST/LUNG: Clear to auscultation bilaterally; No wheeze, equal breath sounds bilaterally   BACK: No spinal tenderness  HEART: Regular rate and rhythm; No murmurs, rubs, or gallops  ABDOMEN: Soft, Nontender, Nondistended; Bowel sounds present  EXTREMITIES:  No clubbing, cyanosis, or edema  PSYCH: Nl behavior, nl affect  NEUROLOGY: AAOx3, non-focal, cranial nerves intact  SKIN: Normal color, No rashes or lesions  LINES:    Cardiovascular Diagnostic Testing:    ECG: Personally reviewed: sinus rhythm with leftward axis and MITRA in V2-V6, II, III, aVF sub mm in I        Labs: Personally reviewed                        15.4   8.02  )-----------( 271      ( 2023 11:27 )             46.5           PT/INR - ( 2023 11:27 )   PT: 11.2 sec;   INR: 1.07 ratio         PTT - ( 2023 11:27 )  PTT:28.2 sec

## 2023-07-30 NOTE — H&P ADULT - ASSESSMENT
====================ASSESSMENT ==============  57M PMH HLD p/w CP found with STEMI s/p LHC with JIMMIE x1 and thrombectomy to pLAD      Plan:  ====================== NEUROLOGY=====================  A&Ox3  - continue to monitor mental status as per protocol     ==================== RESPIRATORY======================  Comfortable on RA  - continue to monitor SpO2 with goal >94%    ====================CARDIOVASCULAR==================  AWSTEMI  - s/p LHC RRA 7/30: pLAD 100% s/p JIMMIE x1 with thrombectomy. LVEDP 30  - TTE ordered  - trend CE until peak  - ordered Tylenol for residual CP, continue to monitor for resolution   - start lopressor as freq AIVR on tele. HD stable and asymptomatic. Uptitrate as needed   - c/w DAPT: ASA and brilinta. Send Brilinta to pharmacy to verify insurance coverage and copayment   - start high intensity statin  - Consider ARB if tolerated for GDMT if SCr stable s.p cath   - monitor RRA access site       ===================HEMATOLOGIC/ONC ===================  CBC wnl   - Monitor H/H and plts  - DVT PPX:  start SQH 4hrs s/p radial band removal     ===================== RENAL =========================  SCr wnl   - Continue monitoring urine output, lytes, SCr/ BUN  - replete lytes prn with goal K >4 and Mg >2    ==================== GASTROINTESTINAL===================  DASH diet as tolerated  - monitor for regular BM while inpatient   =======================    ENDOCRINE  =====================  - send TSH, lipid, and hgb a1c    atorvastatin 80 milliGRAM(s) Oral at bedtime    ========================INFECTIOUS DISEASE================  Afebrile, no leukocytosis   - monitor and trend WBC and temperature curve         Patient requires continuous monitoring with bedside rhythm monitoring, arterial line, pulse oximetry, ventilator monitoring and intermittent blood gas analysis.  Care plan discussed with ICU care team.  Patient remained critical; required more than usual post op care; I have spent 35 minutes providing non-routine post op care, in addition to initial critical time provided by CICU attending Dr. De Paz, re-evaluated multiple times during the day.

## 2023-07-30 NOTE — ED ADULT TRIAGE NOTE - CCCP TRG CHIEF CMPLNT
Notify lab that they should ONLY do Citrate tube for patients with clumping regardless of how CBC is ordered.  This used to be lab policy in the past   chest pain

## 2023-07-31 ENCOUNTER — TRANSCRIPTION ENCOUNTER (OUTPATIENT)
Age: 58
End: 2023-07-31

## 2023-07-31 VITALS
RESPIRATION RATE: 22 BRPM | OXYGEN SATURATION: 99 % | SYSTOLIC BLOOD PRESSURE: 136 MMHG | DIASTOLIC BLOOD PRESSURE: 88 MMHG | HEART RATE: 77 BPM

## 2023-07-31 LAB
A1C WITH ESTIMATED AVERAGE GLUCOSE RESULT: 5.8 % — HIGH (ref 4–5.6)
ALBUMIN SERPL ELPH-MCNC: 4.4 G/DL — SIGNIFICANT CHANGE UP (ref 3.3–5)
ALP SERPL-CCNC: 52 U/L — SIGNIFICANT CHANGE UP (ref 40–120)
ALT FLD-CCNC: 106 U/L — HIGH (ref 10–45)
ANION GAP SERPL CALC-SCNC: 14 MMOL/L — SIGNIFICANT CHANGE UP (ref 5–17)
APTT BLD: 27.1 SEC — SIGNIFICANT CHANGE UP (ref 24.5–35.6)
AST SERPL-CCNC: 483 U/L — HIGH (ref 10–40)
BASOPHILS # BLD AUTO: 0.03 K/UL — SIGNIFICANT CHANGE UP (ref 0–0.2)
BASOPHILS NFR BLD AUTO: 0.3 % — SIGNIFICANT CHANGE UP (ref 0–2)
BILIRUB SERPL-MCNC: 0.8 MG/DL — SIGNIFICANT CHANGE UP (ref 0.2–1.2)
BLD GP AB SCN SERPL QL: NEGATIVE — SIGNIFICANT CHANGE UP
BUN SERPL-MCNC: 7 MG/DL — SIGNIFICANT CHANGE UP (ref 7–23)
CALCIUM SERPL-MCNC: 9.3 MG/DL — SIGNIFICANT CHANGE UP (ref 8.4–10.5)
CHLORIDE SERPL-SCNC: 99 MMOL/L — SIGNIFICANT CHANGE UP (ref 96–108)
CHOLEST SERPL-MCNC: 204 MG/DL — HIGH
CK MB BLD-MCNC: 10.1 % — HIGH (ref 0–3.5)
CK MB BLD-MCNC: 10.6 % — HIGH (ref 0–3.5)
CK MB BLD-MCNC: 7.7 % — HIGH (ref 0–3.5)
CK MB CFR SERPL CALC: 346.3 NG/ML — HIGH (ref 0–6.7)
CK MB CFR SERPL CALC: 544.8 NG/ML — HIGH (ref 0–6.7)
CK MB CFR SERPL CALC: 600 NG/ML — HIGH (ref 0–6.7)
CK SERPL-CCNC: 4478 U/L — HIGH (ref 30–200)
CK SERPL-CCNC: 5390 U/L — HIGH (ref 30–200)
CK SERPL-CCNC: 5687 U/L — HIGH (ref 30–200)
CO2 SERPL-SCNC: 21 MMOL/L — LOW (ref 22–31)
CREAT SERPL-MCNC: 0.74 MG/DL — SIGNIFICANT CHANGE UP (ref 0.5–1.3)
EGFR: 104 ML/MIN/1.73M2 — SIGNIFICANT CHANGE UP
EOSINOPHIL # BLD AUTO: 0.04 K/UL — SIGNIFICANT CHANGE UP (ref 0–0.5)
EOSINOPHIL NFR BLD AUTO: 0.4 % — SIGNIFICANT CHANGE UP (ref 0–6)
ESTIMATED AVERAGE GLUCOSE: 120 MG/DL — HIGH (ref 68–114)
GLUCOSE SERPL-MCNC: 144 MG/DL — HIGH (ref 70–99)
HCT VFR BLD CALC: 47.2 % — SIGNIFICANT CHANGE UP (ref 39–50)
HDLC SERPL-MCNC: 51 MG/DL — SIGNIFICANT CHANGE UP
HGB BLD-MCNC: 15.8 G/DL — SIGNIFICANT CHANGE UP (ref 13–17)
IMM GRANULOCYTES NFR BLD AUTO: 0.3 % — SIGNIFICANT CHANGE UP (ref 0–0.9)
LIPID PNL WITH DIRECT LDL SERPL: 138 MG/DL — HIGH
LYMPHOCYTES # BLD AUTO: 1.56 K/UL — SIGNIFICANT CHANGE UP (ref 1–3.3)
LYMPHOCYTES # BLD AUTO: 16.9 % — SIGNIFICANT CHANGE UP (ref 13–44)
MAGNESIUM SERPL-MCNC: 2 MG/DL — SIGNIFICANT CHANGE UP (ref 1.6–2.6)
MCHC RBC-ENTMCNC: 28.4 PG — SIGNIFICANT CHANGE UP (ref 27–34)
MCHC RBC-ENTMCNC: 33.5 GM/DL — SIGNIFICANT CHANGE UP (ref 32–36)
MCV RBC AUTO: 84.7 FL — SIGNIFICANT CHANGE UP (ref 80–100)
MONOCYTES # BLD AUTO: 0.72 K/UL — SIGNIFICANT CHANGE UP (ref 0–0.9)
MONOCYTES NFR BLD AUTO: 7.8 % — SIGNIFICANT CHANGE UP (ref 2–14)
NEUTROPHILS # BLD AUTO: 6.85 K/UL — SIGNIFICANT CHANGE UP (ref 1.8–7.4)
NEUTROPHILS NFR BLD AUTO: 74.3 % — SIGNIFICANT CHANGE UP (ref 43–77)
NON HDL CHOLESTEROL: 153 MG/DL — HIGH
NRBC # BLD: 0 /100 WBCS — SIGNIFICANT CHANGE UP (ref 0–0)
PHOSPHATE SERPL-MCNC: 2.7 MG/DL — SIGNIFICANT CHANGE UP (ref 2.5–4.5)
PLATELET # BLD AUTO: 256 K/UL — SIGNIFICANT CHANGE UP (ref 150–400)
POTASSIUM SERPL-MCNC: 4 MMOL/L — SIGNIFICANT CHANGE UP (ref 3.5–5.3)
POTASSIUM SERPL-SCNC: 4 MMOL/L — SIGNIFICANT CHANGE UP (ref 3.5–5.3)
PROT SERPL-MCNC: 7.2 G/DL — SIGNIFICANT CHANGE UP (ref 6–8.3)
RBC # BLD: 5.57 M/UL — SIGNIFICANT CHANGE UP (ref 4.2–5.8)
RBC # FLD: 14.4 % — SIGNIFICANT CHANGE UP (ref 10.3–14.5)
RH IG SCN BLD-IMP: POSITIVE — SIGNIFICANT CHANGE UP
SODIUM SERPL-SCNC: 134 MMOL/L — LOW (ref 135–145)
TRIGL SERPL-MCNC: 85 MG/DL — SIGNIFICANT CHANGE UP
TROPONIN T, HIGH SENSITIVITY RESULT: 7333 NG/L — HIGH (ref 0–51)
TROPONIN T, HIGH SENSITIVITY RESULT: 7616 NG/L — HIGH (ref 0–51)
TROPONIN T, HIGH SENSITIVITY RESULT: 8158 NG/L — HIGH (ref 0–51)
TSH SERPL-MCNC: 1 UIU/ML — SIGNIFICANT CHANGE UP (ref 0.27–4.2)
WBC # BLD: 9.23 K/UL — SIGNIFICANT CHANGE UP (ref 3.8–10.5)
WBC # FLD AUTO: 9.23 K/UL — SIGNIFICANT CHANGE UP (ref 3.8–10.5)

## 2023-07-31 PROCEDURE — 93306 TTE W/DOPPLER COMPLETE: CPT | Mod: 26

## 2023-07-31 PROCEDURE — 86901 BLOOD TYPING SEROLOGIC RH(D): CPT

## 2023-07-31 PROCEDURE — 83036 HEMOGLOBIN GLYCOSYLATED A1C: CPT

## 2023-07-31 PROCEDURE — 85025 COMPLETE CBC W/AUTO DIFF WBC: CPT

## 2023-07-31 PROCEDURE — C1874: CPT

## 2023-07-31 PROCEDURE — 82553 CREATINE MB FRACTION: CPT

## 2023-07-31 PROCEDURE — 84484 ASSAY OF TROPONIN QUANT: CPT

## 2023-07-31 PROCEDURE — 36415 COLL VENOUS BLD VENIPUNCTURE: CPT

## 2023-07-31 PROCEDURE — 85730 THROMBOPLASTIN TIME PARTIAL: CPT

## 2023-07-31 PROCEDURE — 93458 L HRT ARTERY/VENTRICLE ANGIO: CPT | Mod: 59

## 2023-07-31 PROCEDURE — 71045 X-RAY EXAM CHEST 1 VIEW: CPT

## 2023-07-31 PROCEDURE — 99239 HOSP IP/OBS DSCHRG MGMT >30: CPT | Mod: 25

## 2023-07-31 PROCEDURE — 86900 BLOOD TYPING SEROLOGIC ABO: CPT

## 2023-07-31 PROCEDURE — 80061 LIPID PANEL: CPT

## 2023-07-31 PROCEDURE — 82550 ASSAY OF CK (CPK): CPT

## 2023-07-31 PROCEDURE — 85610 PROTHROMBIN TIME: CPT

## 2023-07-31 PROCEDURE — 99285 EMERGENCY DEPT VISIT HI MDM: CPT

## 2023-07-31 PROCEDURE — 99231 SBSQ HOSP IP/OBS SF/LOW 25: CPT

## 2023-07-31 PROCEDURE — C1887: CPT

## 2023-07-31 PROCEDURE — 83880 ASSAY OF NATRIURETIC PEPTIDE: CPT

## 2023-07-31 PROCEDURE — C1753: CPT

## 2023-07-31 PROCEDURE — 85520 HEPARIN ASSAY: CPT

## 2023-07-31 PROCEDURE — 84100 ASSAY OF PHOSPHORUS: CPT

## 2023-07-31 PROCEDURE — C1769: CPT

## 2023-07-31 PROCEDURE — C9606: CPT | Mod: LD

## 2023-07-31 PROCEDURE — C8929: CPT

## 2023-07-31 PROCEDURE — C1894: CPT

## 2023-07-31 PROCEDURE — 83735 ASSAY OF MAGNESIUM: CPT

## 2023-07-31 PROCEDURE — 80053 COMPREHEN METABOLIC PANEL: CPT

## 2023-07-31 PROCEDURE — 96374 THER/PROPH/DIAG INJ IV PUSH: CPT

## 2023-07-31 PROCEDURE — 93010 ELECTROCARDIOGRAM REPORT: CPT

## 2023-07-31 PROCEDURE — 86850 RBC ANTIBODY SCREEN: CPT

## 2023-07-31 PROCEDURE — 93005 ELECTROCARDIOGRAM TRACING: CPT

## 2023-07-31 PROCEDURE — 92978 ENDOLUMINL IVUS OCT C 1ST: CPT | Mod: LD

## 2023-07-31 PROCEDURE — 84443 ASSAY THYROID STIM HORMONE: CPT

## 2023-07-31 RX ORDER — ATORVASTATIN CALCIUM 80 MG/1
1 TABLET, FILM COATED ORAL
Qty: 30 | Refills: 3
Start: 2023-07-31

## 2023-07-31 RX ORDER — HEPARIN SODIUM 5000 [USP'U]/ML
5000 INJECTION INTRAVENOUS; SUBCUTANEOUS EVERY 8 HOURS
Refills: 0 | Status: DISCONTINUED | OUTPATIENT
Start: 2023-07-31 | End: 2023-07-31

## 2023-07-31 RX ORDER — TICAGRELOR 90 MG/1
1 TABLET ORAL
Qty: 60 | Refills: 0
Start: 2023-07-31 | End: 2023-08-29

## 2023-07-31 RX ORDER — LOSARTAN POTASSIUM 100 MG/1
1 TABLET, FILM COATED ORAL
Qty: 30 | Refills: 3
Start: 2023-07-31

## 2023-07-31 RX ORDER — METOPROLOL TARTRATE 50 MG
1 TABLET ORAL
Qty: 60 | Refills: 3
Start: 2023-07-31 | End: 2023-11-27

## 2023-07-31 RX ORDER — ASPIRIN/CALCIUM CARB/MAGNESIUM 324 MG
1 TABLET ORAL
Qty: 30 | Refills: 3
Start: 2023-07-31

## 2023-07-31 RX ORDER — TICAGRELOR 90 MG/1
1 TABLET ORAL
Qty: 60 | Refills: 3
Start: 2023-07-31 | End: 2023-11-27

## 2023-07-31 RX ORDER — ASPIRIN/CALCIUM CARB/MAGNESIUM 324 MG
1 TABLET ORAL
Qty: 90 | Refills: 3
Start: 2023-07-31 | End: 2024-07-24

## 2023-07-31 RX ORDER — LOSARTAN POTASSIUM 100 MG/1
25 TABLET, FILM COATED ORAL DAILY
Refills: 0 | Status: DISCONTINUED | OUTPATIENT
Start: 2023-07-31 | End: 2023-07-31

## 2023-07-31 RX ORDER — ATORVASTATIN CALCIUM 80 MG/1
1 TABLET, FILM COATED ORAL
Qty: 90 | Refills: 1
Start: 2023-07-31 | End: 2024-01-26

## 2023-07-31 RX ORDER — METOPROLOL TARTRATE 50 MG
1 TABLET ORAL
Qty: 30 | Refills: 3
Start: 2023-07-31

## 2023-07-31 RX ADMIN — Medication 81 MILLIGRAM(S): at 11:59

## 2023-07-31 RX ADMIN — TICAGRELOR 90 MILLIGRAM(S): 90 TABLET ORAL at 05:41

## 2023-07-31 RX ADMIN — HEPARIN SODIUM 5000 UNIT(S): 5000 INJECTION INTRAVENOUS; SUBCUTANEOUS at 13:45

## 2023-07-31 RX ADMIN — CHLORHEXIDINE GLUCONATE 1 APPLICATION(S): 213 SOLUTION TOPICAL at 05:48

## 2023-07-31 RX ADMIN — Medication 25 MILLIGRAM(S): at 05:41

## 2023-07-31 RX ADMIN — LOSARTAN POTASSIUM 25 MILLIGRAM(S): 100 TABLET, FILM COATED ORAL at 12:02

## 2023-07-31 NOTE — PROGRESS NOTE ADULT - ASSESSMENT
Assessment and Plan  56 yo M with PMHx of HLD (no meds, last total chol 230s) presented to ER c/o 6/10 burning chest pain started 7/30 9am. Patient states that he was playing tennis for 1.5 hours then went home, felt nauseous with two episodes of non-bilious nonbloody vomiting, radiated to bilateral armpits. Pt went to urgent care, found to have MITRA on EKG, and was sent to the ED.  In ED, pt was found with MITRA in inferior and precordial leads. ASA and Brilinta loaded with Heparin gtt and taken for LHC.     In cath lab found with 100% pLAD which patient received thrombectomy and DESx1 via RRA access.     # STEMI s/p pLAD stent and thrombectomy  -Continue to monitor  -Continue ASA, Brilinta (Brilinta Rx sent to [facility])   -Continue statin  -Continue Metoprolol   -Other issues as per primary team    Pt's discharge education done   - Benefit and Risk of ASA/Brilinta, Activity Cath Access site care, f/u care, reportable signs ans symptoms   - outpatient follow up with Cardiologist in 1-2 weeks upon discharge  - Medication adherence stressed to patient with discussion of risks of non-compliance including death and stent thrombosis  - Recommend a heart healthy diet which includes a variety of fruits and vegetables, whole grains, low fat dairy products, legumes and skinless poultry and fish; food prepared with little or no salt and minimize processed foods  - Avoid using NSAIDs  (Aleve, Motrin, ibuprofen, naproxen) while on DAPT, please utilize Tylenol for pain control (not to exceed 4gm in 24 hours)  - pt understood and verbalized       RILEY Harmon-BC  #[id number] or team     Time spent on this encounter >25 minutes

## 2023-07-31 NOTE — PROGRESS NOTE ADULT - ATTENDING COMMENTS
Admitted with anterior wall STEMI s/p PCI  DAPT with ASA, Ticagrelor   Lipitor 80  Hemodynamics acceptable, chest pain free - continue beta blocker  Received a dose of Amio for frequent ventricular ectopy and NSVT overnight  Check TTE  Trend cardiac enzymes until peak  O2 sats mid to high 90s on nasal cannula - wean to room air  Normal renal function, compensated on exam - target even   H/H acceptable  Afebrile, no antibiotics   Sugars controlled, check Hgb A1c   No central access  OOB/ambulate Admitted with anterior wall STEMI s/p PCI  DAPT with ASA, Ticagrelor   Lipitor 80  Hemodynamics acceptable, chest pain free - continue beta blocker  Received a dose of Amio for frequent ventricular ectopy and NSVT overnight, now resolved - hold Amio  TTE with severely reduced LVEF - start Losartan  Cardiac enzymes have peaked  O2 sats mid 90s on room air  Normal renal function, compensated on exam - target even   H/H acceptable  Afebrile, no antibiotics   Sugars controlled  No central access  OOB/ambulate

## 2023-07-31 NOTE — DISCHARGE NOTE NURSING/CASE MANAGEMENT/SOCIAL WORK - NSDCPEFALRISK_GEN_ALL_CORE
For information on Fall & Injury Prevention, visit: [email or web address] OR  [email or web address] OR  https://www.cdc.gov/steadi/patient.html

## 2023-07-31 NOTE — DISCHARGE NOTE NURSING/CASE MANAGEMENT/SOCIAL WORK - NSDCPNINST_GEN_ALL_CORE
Take all medications as prescribed. Follow up with doctors as advised. Any change in your health condition kindly notify your doctor.

## 2023-07-31 NOTE — DISCHARGE NOTE PROVIDER - NSDCCPTREATMENT_GEN_ALL_CORE_FT
PRINCIPAL PROCEDURE  Procedure: Left heart cardiac cath  Findings and Treatment: 7/30  PCI RRA: JIMMIE x1 w/ thrombectomy to pLAD 100%

## 2023-07-31 NOTE — DISCHARGE NOTE PROVIDER - HOSPITAL COURSE
58 yo male with PMHx HLD p/w chest pain found with STEMI s/p LHC with PCI to pLAD via right radial artery. Patient was admitted to CICU for further management. AIVR noted on telemetry requiring Amio bolus x1. Echo significant for EF 34%, regional WMAs, no thrombus, borderline red RVSF. CICU course uneventful. Patient remains chest pain free.

## 2023-07-31 NOTE — PROGRESS NOTE ADULT - SUBJECTIVE AND OBJECTIVE BOX
YESSICA GUILLEN  MRN-[id number]  Patient is a 57y old  Male who presents with a chief complaint of STEMI (30 Jul 2023 22:53)    HPI:  57M PMH HLD (no meds, last total chol 230s), presents with chest pain starting this morning around 9am. Patient states that he was playing tennis for 1.5 hours this morning, then went home, was nauseous with two episodes of non-bilious nonbloody vomiting. States the chest pain continued, described as burning sensation across left & right chest, rated 6/10, with radiation to both armpits, After discussing with his wife, he went to urgent care, was found to have MITRA on EKG, and was sent to the ED.    In ED was found with MITRA in inferior and precordial leads, was heparin, ASA, and brilinta loaded and taken for LHC    In cath lab found with 100% pLAD which patient received thrombectomy and DESx1  (30 Jul 2023 13:55)      24 HOUR EVENTS:    REVIEW OF SYSTEMS:    CONSTITUTIONAL: No weakness, fevers or chills  EYES/ENT: No visual changes;  No vertigo or throat pain   NECK: No pain or stiffness  RESPIRATORY: No cough, wheezing, hemoptysis; No shortness of breath  CARDIOVASCULAR: No chest pain or palpitations  GASTROINTESTINAL: No abdominal or epigastric pain. No nausea, vomiting, or hematemesis; No diarrhea or constipation. No melena or hematochezia.  GENITOURINARY: No dysuria, frequency or hematuria  NEUROLOGICAL: No numbness or weakness  SKIN: No itching, rashes      ICU Vital Signs Last 24 Hrs  T(C): 36.5 (31 Jul 2023 04:00), Max: 37.4 (30 Jul 2023 13:08)  T(F): 97.7 (31 Jul 2023 04:00), Max: 99.3 (30 Jul 2023 13:08)  HR: 69 (31 Jul 2023 07:00) (69 - 130)  BP: 114/79 (31 Jul 2023 07:00) (82/65 - 147/90)  BP(mean): 91 (31 Jul 2023 07:00) (70 - 102)  ABP: --  ABP(mean): --  RR: 16 (31 Jul 2023 07:00) (14 - 31)  SpO2: 98% (31 Jul 2023 07:00) (94% - 100%)    O2 Parameters below as of 31 Jul 2023 07:00  Patient On (Oxygen Delivery Method): room air            CVP(mm Hg): --  CO: --  CI: --  PA: --  PA(mean): --  PA(direct): --  PCWP: --  LA: --  RA: --  SVR: --  SVRI: --  PVR: --  PVRI: --  I&O's Summary    30 Jul 2023 07:01  -  31 Jul 2023 07:00  --------------------------------------------------------  IN: 670 mL / OUT: 1890 mL / NET: -1220 mL        CAPILLARY BLOOD GLUCOSE    CAPILLARY BLOOD GLUCOSE          PHYSICAL EXAM:  GENERAL: No acute distress, well-developed  HEAD:  Atraumatic, Normocephalic  EYES: EOMI, PERRLA, conjunctiva and sclera clear  NECK: Supple, no lymphadenopathy, no JVD  CHEST/LUNG: CTAB; No wheezes, rales, or rhonchi  HEART: Regular rate and rhythm. Normal S1/S2. No murmurs, rubs, or gallops  ABDOMEN: Soft, non-tender, non-distended; normal bowel sounds, no organomegaly  EXTREMITIES:  2+ peripheral pulses b/l, No clubbing, cyanosis, or edema  NEUROLOGY: A&O x 3, no focal deficits  SKIN: No rashes or lesions    ============================I/O===========================   I&O's Detail    30 Jul 2023 07:01  -  31 Jul 2023 07:00  --------------------------------------------------------  IN:    IV PiggyBack: 350 mL    Oral Fluid: 320 mL  Total IN: 670 mL    OUT:    Emesis (mL): 300 mL    Voided (mL): 1590 mL  Total OUT: 1890 mL    Total NET: -1220 mL        ============================ LABS =========================                        15.8   9.23  )-----------( 256      ( 31 Jul 2023 04:53 )             47.2     07-31    134<L>  |  99  |  7   ----------------------------<  144<H>  4.0   |  21<L>  |  0.74    Ca    9.3      31 Jul 2023 04:51  Phos  2.7     07-31  Mg     2.0     07-31    TPro  7.2  /  Alb  4.4  /  TBili  0.8  /  DBili  x   /  AST  483<H>  /  ALT  106<H>  /  AlkPhos  52  07-31    Troponin T, High Sensitivity Result: 8158 ng/L (07-31-23 @ 04:51)  Troponin T, High Sensitivity Result: 7616 ng/L (07-30-23 @ 23:25)  Troponin T, High Sensitivity Result: 6542 ng/L (07-30-23 @ 17:03)  Troponin T, High Sensitivity Result: 38 ng/L (07-30-23 @ 11:27)    CKMB Units: 544.8 ng/mL (07-31-23 @ 04:51)  CKMB Units: 600.0 ng/mL (07-30-23 @ 23:25)  CKMB Units: 600.0 ng/mL (07-30-23 @ 17:03)  CKMB Units: 19.2 ng/mL (07-30-23 @ 11:27)    Creatine Kinase, Serum: 5390 U/L (07-31-23 @ 04:51)  Creatine Kinase, Serum: 5687 U/L (07-30-23 @ 23:25)  Creatine Kinase, Serum: 4726 U/L (07-30-23 @ 17:03)    CPK Mass Assay %: 10.1 % (07-31-23 @ 04:51)  CPK Mass Assay %: 10.6 % (07-30-23 @ 23:25)  CPK Mass Assay %: 12.7 % (07-30-23 @ 17:03)        LIVER FUNCTIONS - ( 31 Jul 2023 04:51 )  Alb: 4.4 g/dL / Pro: 7.2 g/dL / ALK PHOS: 52 U/L / ALT: 106 U/L / AST: 483 U/L / GGT: x           PT/INR - ( 30 Jul 2023 17:03 )   PT: 11.1 sec;   INR: 1.06 ratio         PTT - ( 31 Jul 2023 04:54 )  PTT:27.1 sec      Urinalysis Basic - ( 31 Jul 2023 04:51 )    Color: x / Appearance: x / SG: x / pH: x  Gluc: 144 mg/dL / Ketone: x  / Bili: x / Urobili: x   Blood: x / Protein: x / Nitrite: x   Leuk Esterase: x / RBC: x / WBC x   Sq Epi: x / Non Sq Epi: x / Bacteria: x      ======================Micro/Rad/Cardio=================  Telemetry: Reviewed   EKG: Reviewed  CXR: Reviewed  Culture: Reviewed   Echo:   Cath:   ======================================================  PAST MEDICAL & SURGICAL HISTORY:  Hyperlipemia      No significant past surgical history        ====================ASSESSMENT AND PLAN==============      ====================CARDIOVASCULAR==================    Mechanical Circulatory Support:  [ ] IABP   [ ] Impella 2.5   [ ] Impella CP  Settings:  Augmented Diastolic Pressure:  Impella Flow:     ==Hemodynamics==     (Date) CVP:      PCWP:        PA S/D:            Cardiac Output:             Cardiac Index:            SVR:    Preload (fluids, diuretics):  Afterload (anti-hypertensives, pressors):  Inotropes:    ==Pump==    TTE Date:  LVEF:                              Regional Wall Motion Abnormailities?:  [ ]Yes   [ ] No   If Yes, Details  Diastolic function:  RV function:   Any change from prior?: [ ] Yes   [ ] No   If Yes, Details:   Volume status:   [ ] Hypovolemia    [ ] Euvolemic    [ ] Hypervolemic    ==Coronaries==    Last ischemic workup:   Antiplatelet regimen:   Anticoagulant:   Statin:   Beta blocker:    ==Rhythm==    Current rhythm:  AM EKG Interpretation:   Anti-arrhythmic therapies:   TVP with settings:     metoprolol tartrate 25 milliGRAM(s) Oral two times a day      ====================== NEUROLOGY=====================  Sedation [ ]Yes   [ ] No   If Yes, Medication/Dose:   CAM ICU [ ] Positive  [ ] Negative      ==================== RESPIRATORY======================  [ ] Invasive Mechanical Ventilation   [ ] BIPAP    [ ] HFNC    [ ] NC   Non-invasive Mechanical Ventilation/ HFNC Settings:               ===================== RENAL =========================    07-30-23 @ 07:01  -  07-31-23 @ 07:00  --------------------------------------------------------  IN: 670 mL / OUT: 1890 mL / NET: -1220 mL      Renal Replacement Therapy:  [ ] CRRT      [ ] IHD   Last Session:    Fluid removal:     [ ] Diuretic therapy, Regimen:       ==================== GASTROINTESTINAL===================    Diet:  Last BM:   Indication for Stress Ulcer Prophylaxis, [ ] Yes    [ ] No   If Yes, Medication:       ========================INFECTIOUS DISEASE================  T(C): 36.5 (07-31-23 @ 04:00), Max: 37.4 (07-30-23 @ 13:08)  WBC Count: 9.23 K/uL (07-31-23 @ 04:53)  WBC Count: 11.68 K/uL (07-30-23 @ 17:03)  WBC Count: 8.02 K/uL (07-30-23 @ 11:27)        Current Antibiotics/Antifungals/ Antivirals with start date:       ===================HEMATOLOGIC/ONC ===================  Hemoglobin: 15.8 g/dL (07-31-23 @ 04:53)  Hemoglobin: 15.7 g/dL (07-30-23 @ 17:03)  Hemoglobin: 15.4 g/dL (07-30-23 @ 11:27)    Platelet Count - Automated: 256 K/uL (07-31-23 @ 04:53)  Platelet Count - Automated: 261 K/uL (07-30-23 @ 17:03)  Platelet Count - Automated: 271 K/uL (07-30-23 @ 11:27)    Chemical VTE Prophylaxis:  [ ] Lovenox    [ ] SQH   [ ]NA  Systemic Anticogaulation:  [ ] Yes    [ ] No,  If Yes, Medication and Indication:     aspirin enteric coated 81 milliGRAM(s) Oral daily  ticagrelor 90 milliGRAM(s) Oral every 12 hours    =======================    ENDOCRINE  =====================  Insulin drip  [ ] Yes    [ ] No  Basal Insulin [ ] Yes    [ ] No, Details:   Bolus insulin [ ] Yes    [ ] No  Sliding Scale  [ ] Yes    [ ] No          atorvastatin 80 milliGRAM(s) Oral at bedtime    ======================= LINES/TUBES  =====================  [place]: (Date placed)  Central Line: (Date placed)  HD Catheter: (Date placed)  Arterial Line: (Date placed)  Endotracheal Tube: (Date placed)  Xie: (Date placed)  ======================= DISPOSITION  =====================  [ ] Critical   [ ] Guarded    [ ] Stable    [ ] Maintain in CICU  [ ] Downgrade to [place]  [ ] Discharge Home    Patient requires continuous monitoring with bedside rhythm monitoring, pulse ox monitoring, and intermittent blood gas analysis. Care plan discussed with ICU care team. Patient remained critical and at risk for life threatening decompensation.  Patient seen, examined and plan discussed with CCU team during rounds.     I have personally provided ____ minutes of critical care time excluding time spent on separate procedures, in addition to initial critical care time provided by the CICU Attending, Dr. Shafer/ Elizabeth/ Yanely/ Baldev/ Zhane/ Penelope .       YESSICA GUILLEN  MRN-[id number]  Patient is a 57y old  Male who presents with a chief complaint of STEMI (30 Jul 2023 22:53)    HPI:  57M PMH HLD (no meds, last total chol 230s), presents with chest pain starting this morning around 9am. Patient states that he was playing tennis for 1.5 hours this morning, then went home, was nauseous with two episodes of non-bilious nonbloody vomiting. States the chest pain continued, described as burning sensation across left & right chest, rated 6/10, with radiation to both armpits, After discussing with his wife, he went to urgent care, was found to have MITRA on EKG, and was sent to the ED.    In ED was found with MITRA in inferior and precordial leads, was heparin, ASA, and brilinta loaded and taken for LHC    In cath lab found with 100% pLAD which patient received thrombectomy and DESx1  (30 Jul 2023 13:55)      24 HOUR EVENTS: AIVR frequently on telemetry, received 1 bolus of amiodarone.    REVIEW OF SYSTEMS:    CONSTITUTIONAL: No weakness, fevers or chills  EYES/ENT: No visual changes;  No vertigo or throat pain   NECK: No pain or stiffness  RESPIRATORY: No cough, wheezing, hemoptysis; No shortness of breath  CARDIOVASCULAR: +very mild chest pain 1/10, down from 3/10 yesterday; no palpitations  GASTROINTESTINAL: No abdominal or epigastric pain. No nausea, vomiting, or hematemesis; No diarrhea or constipation. No melena or hematochezia.  GENITOURINARY: No dysuria, frequency or hematuria  NEUROLOGICAL: No numbness or weakness  SKIN: No itching, rashes      ICU Vital Signs Last 24 Hrs  T(C): 36.5 (31 Jul 2023 04:00), Max: 37.4 (30 Jul 2023 13:08)  T(F): 97.7 (31 Jul 2023 04:00), Max: 99.3 (30 Jul 2023 13:08)  HR: 69 (31 Jul 2023 07:00) (69 - 130)  BP: 114/79 (31 Jul 2023 07:00) (82/65 - 147/90)  BP(mean): 91 (31 Jul 2023 07:00) (70 - 102)  ABP: --  ABP(mean): --  RR: 16 (31 Jul 2023 07:00) (14 - 31)  SpO2: 98% (31 Jul 2023 07:00) (94% - 100%)    O2 Parameters below as of 31 Jul 2023 07:00  Patient On (Oxygen Delivery Method): room air            CVP(mm Hg): --  CO: --  CI: --  PA: --  PA(mean): --  PA(direct): --  PCWP: --  LA: --  RA: --  SVR: --  SVRI: --  PVR: --  PVRI: --  I&O's Summary    30 Jul 2023 07:01  -  31 Jul 2023 07:00  --------------------------------------------------------  IN: 670 mL / OUT: 1890 mL / NET: -1220 mL        CAPILLARY BLOOD GLUCOSE    CAPILLARY BLOOD GLUCOSE          PHYSICAL EXAM:  GENERAL: No acute distress, well-developed  HEAD:  Atraumatic, Normocephalic  EYES: EOMI, PERRLA, conjunctiva and sclera clear  NECK: Supple, no lymphadenopathy, no JVD  CHEST/LUNG: CTAB; No wheezes, rales, or rhonchi  HEART: Regular rate and rhythm. Normal S1/S2. No murmurs, rubs, or gallops  ABDOMEN: Soft, non-tender, non-distended; normal bowel sounds, no organomegaly  EXTREMITIES:  2+ peripheral pulses b/l, No clubbing, cyanosis, or edema  NEUROLOGY: A&O x 3, no focal deficits  SKIN: No rashes or lesions    ============================I/O===========================   I&O's Detail    30 Jul 2023 07:01  -  31 Jul 2023 07:00  --------------------------------------------------------  IN:    IV PiggyBack: 350 mL    Oral Fluid: 320 mL  Total IN: 670 mL    OUT:    Emesis (mL): 300 mL    Voided (mL): 1590 mL  Total OUT: 1890 mL    Total NET: -1220 mL        ============================ LABS =========================                        15.8   9.23  )-----------( 256      ( 31 Jul 2023 04:53 )             47.2     07-31    134<L>  |  99  |  7   ----------------------------<  144<H>  4.0   |  21<L>  |  0.74    Ca    9.3      31 Jul 2023 04:51  Phos  2.7     07-31  Mg     2.0     07-31    TPro  7.2  /  Alb  4.4  /  TBili  0.8  /  DBili  x   /  AST  483<H>  /  ALT  106<H>  /  AlkPhos  52  07-31    Troponin T, High Sensitivity Result: 8158 ng/L (07-31-23 @ 04:51)  Troponin T, High Sensitivity Result: 7616 ng/L (07-30-23 @ 23:25)  Troponin T, High Sensitivity Result: 6542 ng/L (07-30-23 @ 17:03)  Troponin T, High Sensitivity Result: 38 ng/L (07-30-23 @ 11:27)    CKMB Units: 544.8 ng/mL (07-31-23 @ 04:51)  CKMB Units: 600.0 ng/mL (07-30-23 @ 23:25)  CKMB Units: 600.0 ng/mL (07-30-23 @ 17:03)  CKMB Units: 19.2 ng/mL (07-30-23 @ 11:27)    Creatine Kinase, Serum: 5390 U/L (07-31-23 @ 04:51)  Creatine Kinase, Serum: 5687 U/L (07-30-23 @ 23:25)  Creatine Kinase, Serum: 4726 U/L (07-30-23 @ 17:03)    CPK Mass Assay %: 10.1 % (07-31-23 @ 04:51)  CPK Mass Assay %: 10.6 % (07-30-23 @ 23:25)  CPK Mass Assay %: 12.7 % (07-30-23 @ 17:03)        LIVER FUNCTIONS - ( 31 Jul 2023 04:51 )  Alb: 4.4 g/dL / Pro: 7.2 g/dL / ALK PHOS: 52 U/L / ALT: 106 U/L / AST: 483 U/L / GGT: x           PT/INR - ( 30 Jul 2023 17:03 )   PT: 11.1 sec;   INR: 1.06 ratio         PTT - ( 31 Jul 2023 04:54 )  PTT:27.1 sec      Urinalysis Basic - ( 31 Jul 2023 04:51 )    Color: x / Appearance: x / SG: x / pH: x  Gluc: 144 mg/dL / Ketone: x  / Bili: x / Urobili: x   Blood: x / Protein: x / Nitrite: x   Leuk Esterase: x / RBC: x / WBC x   Sq Epi: x / Non Sq Epi: x / Bacteria: x      ======================Micro/Rad/Cardio=================  Telemetry: Reviewed   EKG: Reviewed  CXR: Reviewed  Culture: Reviewed   Echo:   Cath:   ======================================================  PAST MEDICAL & SURGICAL HISTORY:  Hyperlipemia      No significant past surgical history        ====================ASSESSMENT AND PLAN==============      ====================CARDIOVASCULAR==================    Mechanical Circulatory Support:  [ ] IABP   [ ] Impella 2.5   [ ] Impella CP  Settings:  Augmented Diastolic Pressure:  Impella Flow:     ==Hemodynamics==     (Date) CVP:      PCWP:        PA S/D:            Cardiac Output:             Cardiac Index:            SVR:    Preload (fluids, diuretics):  Afterload (anti-hypertensives, pressors):  Inotropes:    ==Pump==    TTE Date: 7/31/23  LVEF: 34%                             Regional Wall Motion Abnormailities?:  [x]Yes   [ ] No   If Yes, Details:  consistent with ischemic heart disease  Diastolic function: unchanged  RV function: Normal right ventricular cavity size, normal right ventricular wall thickness and borderline reduced right ventricular systolic function. The tricuspid annular plane systolic excursion (TAPSE) is 1.5 cm (normal >=1.7 cm).   6. There is trace tricuspid regurgitation. There is insufficient tricuspid regurgitation detected to calculate pulmonary artery systolic pressure.  Any change from prior?: [ ] Yes   [ ] No   If Yes, Details: (no prior heart echo at this hospital)  Volume status:   [ ] Hypovolemia    [x] Euvolemic    [ ] Hypervolemic    ==Coronaries==    Last ischemic workup:   Antiplatelet regimen:   Anticoagulant:   Statin:   Beta blocker:    ==Rhythm==    Current rhythm:  AM EKG Interpretation:   Anti-arrhythmic therapies:   TVP with settings:     metoprolol tartrate 25 milliGRAM(s) Oral two times a day      ====================== NEUROLOGY=====================  Sedation [ ]Yes   [ ] No   If Yes, Medication/Dose:   CAM ICU [ ] Positive  [ ] Negative      ==================== RESPIRATORY======================  [ ] Invasive Mechanical Ventilation   [ ] BIPAP    [ ] HFNC    [ ] NC   Non-invasive Mechanical Ventilation/ HFNC Settings:               ===================== RENAL =========================    07-30-23 @ 07:01  -  07-31-23 @ 07:00  --------------------------------------------------------  IN: 670 mL / OUT: 1890 mL / NET: -1220 mL      Renal Replacement Therapy:  [ ] CRRT      [ ] IHD   Last Session:    Fluid removal:     [ ] Diuretic therapy, Regimen:       ==================== GASTROINTESTINAL===================    Diet:  Last BM:   Indication for Stress Ulcer Prophylaxis, [ ] Yes    [ ] No   If Yes, Medication:       ========================INFECTIOUS DISEASE================  T(C): 36.5 (07-31-23 @ 04:00), Max: 37.4 (07-30-23 @ 13:08)  WBC Count: 9.23 K/uL (07-31-23 @ 04:53)  WBC Count: 11.68 K/uL (07-30-23 @ 17:03)  WBC Count: 8.02 K/uL (07-30-23 @ 11:27)        Current Antibiotics/Antifungals/ Antivirals with start date:       ===================HEMATOLOGIC/ONC ===================  Hemoglobin: 15.8 g/dL (07-31-23 @ 04:53)  Hemoglobin: 15.7 g/dL (07-30-23 @ 17:03)  Hemoglobin: 15.4 g/dL (07-30-23 @ 11:27)    Platelet Count - Automated: 256 K/uL (07-31-23 @ 04:53)  Platelet Count - Automated: 261 K/uL (07-30-23 @ 17:03)  Platelet Count - Automated: 271 K/uL (07-30-23 @ 11:27)    Chemical VTE Prophylaxis:  [ ] Lovenox    [ ] SQH   [ ]NA  Systemic Anticogaulation:  [ ] Yes    [ ] No,  If Yes, Medication and Indication:     aspirin enteric coated 81 milliGRAM(s) Oral daily  ticagrelor 90 milliGRAM(s) Oral every 12 hours    =======================    ENDOCRINE  =====================  Insulin drip  [ ] Yes    [ ] No  Basal Insulin [ ] Yes    [ ] No, Details:   Bolus insulin [ ] Yes    [ ] No  Sliding Scale  [ ] Yes    [ ] No          atorvastatin 80 milliGRAM(s) Oral at bedtime    ======================= LINES/TUBES  =====================  [place]: (Date placed)  Central Line: (Date placed)  HD Catheter: (Date placed)  Arterial Line: (Date placed)  Endotracheal Tube: (Date placed)  Xie: (Date placed)  ======================= DISPOSITION  =====================  [ ] Critical   [ ] Guarded    [ ] Stable    [ ] Maintain in CICU  [ ] Downgrade to [place]  [ ] Discharge Home    Patient requires continuous monitoring with bedside rhythm monitoring, pulse ox monitoring, and intermittent blood gas analysis. Care plan discussed with ICU care team. Patient remained critical and at risk for life threatening decompensation.  Patient seen, examined and plan discussed with CCU team during rounds.     I have personally provided ____ minutes of critical care time excluding time spent on separate procedures, in addition to initial critical care time provided by the CICU Attending, Dr. Shafer/ Elizabeth/ Yanely/ Baldev/ Zhane/ Penelope .

## 2023-07-31 NOTE — PROGRESS NOTE ADULT - ASSESSMENT
57M PMH HLD p/w CP found with STEMI s/p LHC with JIMMIE x1 and thrombectomy to pLAD      Plan:  ====================== NEUROLOGY=====================  A&Ox3  - continue to monitor mental status as per protocol     ==================== RESPIRATORY======================  Comfortable on RA  - continue to monitor SpO2 with goal >94%    ====================CARDIOVASCULAR==================  AWSTEMI  - s/p LHC RRA 7/30: pLAD 100% s/p JIMMIE x1 with thrombectomy. LVEDP 30  -consider nitrate if chest pain persists  - lopressor (metoprolol) and losartan as primary therapy for heart failure with reduced ejection fraction  - c/w DAPT: ASA and brilinta.   - continue high intensity statin (atorvastatin) for secondary prevention  -monitor RRA access site       ===================HEMATOLOGIC/ONC ===================  CBC wnl   - Monitor H/H and plts  - DVT PPX:  start SQH 4hrs s/p radial band removal     ===================== RENAL =========================  SCr wnl   - Continue monitoring urine output, lytes, SCr/ BUN  - replete lytes prn with goal K >4 and Mg >2    ==================== GASTROINTESTINAL===================  DASH diet as tolerated  - monitor for regular BM while inpatient   =======================    ENDOCRINE  =====================  - send TSH, lipid, and hgb a1c    atorvastatin 80 milliGRAM(s) Oral at bedtime    ========================INFECTIOUS DISEASE================  Afebrile, no leukocytosis   - monitor and trend WBC and temperature curve

## 2023-07-31 NOTE — DISCHARGE NOTE PROVIDER - NSDCFUADDINST_GEN_ALL_CORE_FT
Continue your medications. Do not stop Aspirin or Brilinta unless instructed by your cardiologist.  No heavy lifting or pushing/pulling or strenuous activity with procedure arm for 2 weeks. No driving for 2 days. No sex for 1 week.  You may shower 24 hours following procedure but no soaking of your wrist in water (such as in a pool, sink, or tub) for 1 week. Check wrist site for bleeding and/or swelling daily following procedure. Call your doctor/cardiologist immediately for bleeding or swelling or if you have increased/persistent pain or drainage at the wrist site or if you have numbness, tingling or blue or white coloring of your hand or fingers.  Follow up with your cardiologist in 1- 2 weeks. You may call [facility] at [phone] or [phone] after office hours and weekends with any questions or concerns following your procedure.

## 2023-07-31 NOTE — PROGRESS NOTE ADULT - SUBJECTIVE AND OBJECTIVE BOX
Interventional Cardiology Post Cath Progress Note      Patient is a 57y old  Male who presents with a chief complaint of STEMI (2023 22:53)      Pt was seen at bedside, denies chest pain or SOB.     Tele: SR 60-70's    Allergies    penicillin (Rash)    Intolerances        Medications:  aspirin enteric coated 81 milliGRAM(s) Oral daily  atorvastatin 80 milliGRAM(s) Oral at bedtime  chlorhexidine 4% Liquid 1 Application(s) Topical <User Schedule>  metoprolol tartrate 25 milliGRAM(s) Oral two times a day  ticagrelor 90 milliGRAM(s) Oral every 12 hours      Vitals:  T(C): 36.5 (23 @ 04:00), Max: 37.4 (23 @ 13:08)  HR: 69 (23 @ 07:00) (69 - 130)  BP: 114/79 (23 @ 07:00) (82/65 - 147/90)  BP(mean): 91 (23 @ 07:00) (70 - 102)  RR: 16 (23 @ 07:00) (14 - 31)  SpO2: 98% (23 @ 07:00) (94% - 100%)  Wt(kg): --  Daily Height in cm: 182.88 (2023 13:08)    Daily Weight in k.8 (2023 05:00)  I&O's Summary    2023 07:01  -  2023 07:00  --------------------------------------------------------  IN: 670 mL / OUT: 1890 mL / NET: -1220 mL          Review of System:   Denies chest pain, SOB, dizziness or palpitation  All other systems: no other complaints offered         Physical Exam:  Appearance: Normal  Eyes: PERRL, EOMI  HENT: Normal oral mus[place]a, NC/AT  Cardiovascular: S1S2, RRR, No M/R/G, no JVD, No Lower extremity edema  Procedural Rt Radial \ Access Site: No hematoma, Non-tender to palpation, 2+ pulse, No bruit, No Ecchymosis  Respiratory: Clear to auscultation bilaterally  Gastrointestinal: Soft, Non tender, Normal Bowel Sounds  Musculoskeletal: No clubbing, No joint deformity   Neurologic: Non-focal  Lymphatic: No lymphadenopathy  Psychiatry: AAOx3, Mood & affect appropriate  Skin: No rashes, No ecchymoses, No cyanosis        134<L>  |  99  |  7   ----------------------------<  144<H>  4.0   |  21<L>  |  0.74    Ca    9.3      2023 04:51  Phos  2.7       Mg     2.0         TPro  7.2  /  Alb  4.4  /  TBili  0.8  /  DBili  x   /  AST  483<H>  /  ALT  106<H>  /  AlkPhos  52      PT/INR - ( 2023 17:03 )   PT: 11.1 sec;   INR: 1.06 ratio         PTT - ( 2023 04:54 )  PTT:27.1 sec  CARDIAC MARKERS ( 2023 04:51 )  x     / x     / 5390 U/L / x     / 544.8 ng/mL  CARDIAC MARKERS ( 2023 23:25 )  x     / x     / 5687 U/L / x     / 600.0 ng/mL  CARDIAC MARKERS ( 2023 17:03 )  x     / x     / 4726 U/L / x     / 600.0 ng/mL  CARDIAC MARKERS ( 2023 11:27 )  x     / x     / x     / x     / 19.2 ng/mL        Lipid panel Total Cholesterol: 204  LDL: --  HDL: 51  T      Hgb A1c A1C with Estimated Average Glucose Result: 5.8 % ( @ 17:03)    ECG:    Echo: done at bedside at the time of exam     Stress Testing:     Cath:  < from: Cardiac Catheterization (23 @ 12:22) >  Successful thrombectomy followed by IVUS guided PCI to the pLAD as the STEMI culprit lesion.    Diagnostic Findings:   Coronary Angiography: The coronary circulation is right dominant.    LM: Left main artery: Angiography shows minor irregularities.    LAD: Proximal left anterior descending: There is a 100 % stenosis. BLAYNE Flow 0.  CX: Proximal circumflex: There is a 30 % stenosis.    RCA: Right coronary artery: There is a 10 % stenosis.    Left Heart Cath   Ejection fraction was visually estimated by LV Gram. The left ventricular end diastolic pressure was 29 mmHg.  < end of copied text >    Imaging:  < from: Xray Chest 1 View- PORTABLE-Urgent (Xray Chest 1 View- PORTABLE-Urgent .) (23 @ 11:35) >  FINDINGS:  The cardiac silhouette is normal in size. There are no focal   consolidations or pleural effusions. The hilar and mediastinal structures   appear unremarkable. The osseous structures are intact.    IMPRESSION: Clear lungs. < end of copied text >

## 2023-07-31 NOTE — DISCHARGE NOTE PROVIDER - CARE PROVIDER_API CALL
Boris Torres  Cardiology  [address] [place] [address], [address] [place] [id number]  Phone: [phone]  Fax: [phone]  Follow Up Time:

## 2023-07-31 NOTE — DISCHARGE NOTE PROVIDER - NSDCCPCAREPLAN_GEN_ALL_CORE_FT
PRINCIPAL DISCHARGE DIAGNOSIS  Diagnosis: STEMI (ST elevation myocardial infarction)  Assessment and Plan of Treatment: You had  heart attack.   Low salt, low fat diet.   Weight management.   Take medications as prescribed.    No smoking.  Follow up appointments with your doctor(s)  as instructed.

## 2023-07-31 NOTE — DISCHARGE NOTE NURSING/CASE MANAGEMENT/SOCIAL WORK - PATIENT PORTAL LINK FT
You can access the FollowMyHealth Patient Portal offered by [facility] by registering at the following website: [email or web address]. By joining [facility]’s FollowMyHealth portal, you will also be able to view your health information using other applications (apps) compatible with our system.

## 2023-07-31 NOTE — DISCHARGE NOTE PROVIDER - NSDCMRMEDTOKEN_GEN_ALL_CORE_FT
Aspirin Enteric Coated 81 mg oral delayed release tablet: 1 tab(s) orally once a day  atorvastatin 80 mg oral tablet: 1 tab(s) orally once a day (at bedtime)  Brilinta (ticagrelor) 90 mg oral tablet: 1 tab(s) orally 2 times a day  Metoprolol Tartrate 25 mg oral tablet: 1 tab(s) orally 2 times a day  ticagrelor 90 mg oral tablet: 1 tab(s) orally every 12 hours   aspirin 81 mg oral delayed release tablet: 1 tab(s) orally once a day  Aspirin Enteric Coated 81 mg oral delayed release tablet: 1 tab(s) orally once a day  atorvastatin 80 mg oral tablet: 1 tab(s) orally once a day (at bedtime)  atorvastatin 80 mg oral tablet: 1 tab(s) orally once a day (at bedtime)  Brilinta (ticagrelor) 90 mg oral tablet: 1 tab(s) orally 2 times a day  losartan 25 mg oral tablet: 1 tab(s) orally once a day  Metoprolol Tartrate 25 mg oral tablet: 1 tab(s) orally 2 times a day  ticagrelor 90 mg oral tablet: 1 tab(s) orally every 12 hours  Toprol-XL 50 mg oral tablet, extended release: 1 tab(s) orally once a day   aspirin 81 mg oral delayed release tablet: 1 tab(s) orally once a day  atorvastatin 80 mg oral tablet: 1 tab(s) orally once a day (at bedtime)  Brilinta (ticagrelor) 90 mg oral tablet: 1 tab(s) orally 2 times a day  losartan 25 mg oral tablet: 1 tab(s) orally once a day  Toprol-XL 50 mg oral tablet, extended release: 1 tab(s) orally once a day   aspirin 81 mg oral delayed release tablet: 1 tab(s) orally once a day  Brilinta (ticagrelor) 90 mg oral tablet: 1 tab(s) orally 2 times a day  Lipitor 80 mg oral tablet: 1 tab(s) orally once a day (at bedtime)  losartan 25 mg oral tablet: 1 tab(s) orally once a day  Toprol-XL 50 mg oral tablet, extended release: 1 tab(s) orally once a day

## 2023-08-02 ENCOUNTER — TRANSCRIPTION ENCOUNTER (OUTPATIENT)
Age: 58
End: 2023-08-02

## 2023-08-02 PROBLEM — E78.5 HYPERLIPIDEMIA, UNSPECIFIED: Chronic | Status: ACTIVE | Noted: 2023-07-30

## 2023-08-14 ENCOUNTER — NON-APPOINTMENT (OUTPATIENT)
Age: 58
End: 2023-08-14

## 2023-08-14 ENCOUNTER — APPOINTMENT (OUTPATIENT)
Dept: CARDIOLOGY | Facility: CLINIC | Age: 58
End: 2023-08-14
Payer: COMMERCIAL

## 2023-08-14 VITALS
SYSTOLIC BLOOD PRESSURE: 112 MMHG | DIASTOLIC BLOOD PRESSURE: 77 MMHG | BODY MASS INDEX: 22.35 KG/M2 | OXYGEN SATURATION: 99 % | HEART RATE: 76 BPM | HEIGHT: 72 IN | WEIGHT: 165 LBS

## 2023-08-14 PROCEDURE — 99215 OFFICE O/P EST HI 40 MIN: CPT | Mod: 25

## 2023-08-14 PROCEDURE — 93000 ELECTROCARDIOGRAM COMPLETE: CPT

## 2023-08-14 RX ORDER — AZITHROMYCIN 250 MG/1
250 TABLET, FILM COATED ORAL
Qty: 4 | Refills: 0 | Status: DISCONTINUED | COMMUNITY
Start: 2018-06-14 | End: 2023-08-14

## 2023-08-14 RX ORDER — ATOVAQUONE AND PROGUANIL HYDROCHLORIDE 250; 100 MG/1; MG/1
250-100 TABLET, FILM COATED ORAL DAILY
Qty: 22 | Refills: 0 | Status: DISCONTINUED | COMMUNITY
Start: 2018-06-14 | End: 2023-08-14

## 2023-09-11 ENCOUNTER — APPOINTMENT (OUTPATIENT)
Dept: CARDIOLOGY | Facility: CLINIC | Age: 58
End: 2023-09-11

## 2023-11-15 ENCOUNTER — APPOINTMENT (OUTPATIENT)
Dept: CARDIOLOGY | Facility: CLINIC | Age: 58
End: 2023-11-15
Payer: COMMERCIAL

## 2023-11-15 VITALS
HEIGHT: 72 IN | HEART RATE: 73 BPM | BODY MASS INDEX: 22.08 KG/M2 | SYSTOLIC BLOOD PRESSURE: 120 MMHG | DIASTOLIC BLOOD PRESSURE: 77 MMHG | OXYGEN SATURATION: 100 % | WEIGHT: 163 LBS

## 2023-11-15 PROCEDURE — 99214 OFFICE O/P EST MOD 30 MIN: CPT | Mod: 25

## 2023-11-15 PROCEDURE — 93000 ELECTROCARDIOGRAM COMPLETE: CPT

## 2023-11-15 RX ORDER — ATORVASTATIN CALCIUM 80 MG/1
80 TABLET, FILM COATED ORAL
Qty: 90 | Refills: 3 | Status: ACTIVE | COMMUNITY
Start: 1900-01-01 | End: 1900-01-01

## 2023-11-29 ENCOUNTER — APPOINTMENT (OUTPATIENT)
Dept: CARDIOLOGY | Facility: CLINIC | Age: 58
End: 2023-11-29
Payer: COMMERCIAL

## 2023-11-29 PROCEDURE — 93306 TTE W/DOPPLER COMPLETE: CPT

## 2024-01-24 ENCOUNTER — APPOINTMENT (OUTPATIENT)
Dept: CARDIOLOGY | Facility: CLINIC | Age: 59
End: 2024-01-24
Payer: COMMERCIAL

## 2024-01-24 PROCEDURE — 99214 OFFICE O/P EST MOD 30 MIN: CPT | Mod: 95,25

## 2024-01-24 RX ORDER — ASPIRIN 81 MG/1
81 TABLET, CHEWABLE ORAL
Refills: 0 | Status: COMPLETED | COMMUNITY
End: 2024-01-24

## 2024-01-24 RX ORDER — LOSARTAN POTASSIUM 25 MG/1
25 TABLET, FILM COATED ORAL DAILY
Qty: 90 | Refills: 3 | Status: COMPLETED | COMMUNITY
End: 2024-01-24

## 2024-01-24 RX ORDER — SACUBITRIL AND VALSARTAN 49; 51 MG/1; MG/1
49-51 TABLET, FILM COATED ORAL
Qty: 180 | Refills: 3 | Status: ACTIVE | COMMUNITY
Start: 2024-01-24 | End: 1900-01-01

## 2024-01-24 NOTE — HISTORY OF PRESENT ILLNESS
[FreeTextEntry1] : Mr. Garcia is a 58 year-old man with hx of CAD and STEMI in 7/2023 s/p PCI to the pLAD. He has been doing much better since the procedure and is improving his diet and lifestyle.

## 2024-01-24 NOTE — DISCUSSION/SUMMARY
[FreeTextEntry1] : Plan: 1. Given the ACS - DAPT for at least 1 year 2. secondary prev meds - change losartan to entresto for EF improvement 3. Diet and lifestyle mod. 4. RTC 3 months. [EKG obtained to assist in diagnosis and management of assessed problem(s)] : EKG obtained to assist in diagnosis and management of assessed problem(s)

## 2024-02-05 ENCOUNTER — TRANSCRIPTION ENCOUNTER (OUTPATIENT)
Age: 59
End: 2024-02-05

## 2024-03-18 ENCOUNTER — APPOINTMENT (OUTPATIENT)
Dept: CARDIOLOGY | Facility: CLINIC | Age: 59
End: 2024-03-18
Payer: COMMERCIAL

## 2024-03-18 ENCOUNTER — NON-APPOINTMENT (OUTPATIENT)
Age: 59
End: 2024-03-18

## 2024-03-18 VITALS
WEIGHT: 135 LBS | DIASTOLIC BLOOD PRESSURE: 67 MMHG | HEIGHT: 72 IN | OXYGEN SATURATION: 97 % | HEART RATE: 82 BPM | SYSTOLIC BLOOD PRESSURE: 101 MMHG | BODY MASS INDEX: 18.28 KG/M2

## 2024-03-18 DIAGNOSIS — I25.10 ATHEROSCLEROTIC HEART DISEASE OF NATIVE CORONARY ARTERY W/OUT ANGINA PECTORIS: ICD-10-CM

## 2024-03-18 DIAGNOSIS — I21.02 ST ELEVATION (STEMI) MYOCARDIAL INFARCTION INVOLVING LEFT ANTERIOR DESCENDING CORONARY ARTERY: ICD-10-CM

## 2024-03-18 PROCEDURE — G2211 COMPLEX E/M VISIT ADD ON: CPT

## 2024-03-18 PROCEDURE — 99214 OFFICE O/P EST MOD 30 MIN: CPT

## 2024-03-18 PROCEDURE — 93000 ELECTROCARDIOGRAM COMPLETE: CPT

## 2024-06-01 PROBLEM — I21.02 ST ELEVATION MYOCARDIAL INFARCTION INVOLVING LEFT ANTERIOR DESCENDING (LAD) CORONARY ARTERY: Status: ACTIVE | Noted: 2023-10-09

## 2024-06-01 PROBLEM — I25.10 CAD IN NATIVE ARTERY: Status: ACTIVE | Noted: 2023-10-09

## 2024-06-03 ENCOUNTER — RX RENEWAL (OUTPATIENT)
Age: 59
End: 2024-06-03

## 2024-06-05 RX ORDER — METOPROLOL SUCCINATE 50 MG/1
50 TABLET, EXTENDED RELEASE ORAL
Qty: 90 | Refills: 3 | Status: ACTIVE | COMMUNITY
Start: 1900-01-01 | End: 1900-01-01

## 2024-06-07 ENCOUNTER — RX RENEWAL (OUTPATIENT)
Age: 59
End: 2024-06-07

## 2024-06-10 RX ORDER — TICAGRELOR 90 MG/1
90 TABLET ORAL
Qty: 180 | Refills: 2 | Status: ACTIVE | COMMUNITY
Start: 1900-01-01 | End: 1900-01-01

## 2024-06-24 ENCOUNTER — OUTPATIENT (OUTPATIENT)
Dept: OUTPATIENT SERVICES | Facility: HOSPITAL | Age: 59
LOS: 1 days | End: 2024-06-24
Payer: COMMERCIAL

## 2024-06-24 ENCOUNTER — RESULT REVIEW (OUTPATIENT)
Age: 59
End: 2024-06-24

## 2024-06-24 ENCOUNTER — APPOINTMENT (OUTPATIENT)
Dept: CV DIAGNOSITCS | Facility: HOSPITAL | Age: 59
End: 2024-06-24

## 2024-06-24 DIAGNOSIS — I25.10 ATHEROSCLEROTIC HEART DISEASE OF NATIVE CORONARY ARTERY WITHOUT ANGINA PECTORIS: ICD-10-CM

## 2024-06-24 PROCEDURE — 76376 3D RENDER W/INTRP POSTPROCES: CPT

## 2024-06-24 PROCEDURE — 76376 3D RENDER W/INTRP POSTPROCES: CPT | Mod: 26

## 2024-06-24 PROCEDURE — 93356 MYOCRD STRAIN IMG SPCKL TRCK: CPT

## 2024-06-24 PROCEDURE — 93306 TTE W/DOPPLER COMPLETE: CPT | Mod: 26

## 2024-06-24 PROCEDURE — 93306 TTE W/DOPPLER COMPLETE: CPT

## 2024-06-26 ENCOUNTER — NON-APPOINTMENT (OUTPATIENT)
Age: 59
End: 2024-06-26

## 2024-06-26 ENCOUNTER — APPOINTMENT (OUTPATIENT)
Dept: CARDIOLOGY | Facility: CLINIC | Age: 59
End: 2024-06-26

## 2024-06-26 VITALS
WEIGHT: 165 LBS | HEART RATE: 72 BPM | OXYGEN SATURATION: 100 % | HEIGHT: 72 IN | DIASTOLIC BLOOD PRESSURE: 74 MMHG | SYSTOLIC BLOOD PRESSURE: 113 MMHG | BODY MASS INDEX: 22.35 KG/M2

## 2024-06-26 PROCEDURE — 99214 OFFICE O/P EST MOD 30 MIN: CPT

## 2024-06-26 PROCEDURE — 93000 ELECTROCARDIOGRAM COMPLETE: CPT

## 2024-06-26 PROCEDURE — G2211 COMPLEX E/M VISIT ADD ON: CPT

## 2024-10-19 ENCOUNTER — NON-APPOINTMENT (OUTPATIENT)
Age: 59
End: 2024-10-19

## 2024-12-02 ENCOUNTER — NON-APPOINTMENT (OUTPATIENT)
Age: 59
End: 2024-12-02

## 2024-12-02 ENCOUNTER — APPOINTMENT (OUTPATIENT)
Dept: CARDIOLOGY | Facility: CLINIC | Age: 59
End: 2024-12-02

## 2024-12-02 VITALS
HEART RATE: 75 BPM | WEIGHT: 162 LBS | BODY MASS INDEX: 21.97 KG/M2 | SYSTOLIC BLOOD PRESSURE: 113 MMHG | OXYGEN SATURATION: 100 % | DIASTOLIC BLOOD PRESSURE: 75 MMHG

## 2024-12-02 PROCEDURE — G2211 COMPLEX E/M VISIT ADD ON: CPT | Mod: NC

## 2024-12-02 PROCEDURE — 93000 ELECTROCARDIOGRAM COMPLETE: CPT

## 2024-12-02 PROCEDURE — 99214 OFFICE O/P EST MOD 30 MIN: CPT

## 2024-12-10 ENCOUNTER — RX RENEWAL (OUTPATIENT)
Age: 59
End: 2024-12-10

## 2024-12-10 RX ORDER — ASPIRIN 81 MG/1
81 TABLET, COATED ORAL
Qty: 90 | Refills: 3 | Status: ACTIVE | COMMUNITY
Start: 2024-12-10 | End: 1900-01-01

## 2025-03-05 ENCOUNTER — RX RENEWAL (OUTPATIENT)
Age: 60
End: 2025-03-05

## 2025-03-07 ENCOUNTER — RX RENEWAL (OUTPATIENT)
Age: 60
End: 2025-03-07

## 2025-03-10 RX ORDER — TICAGRELOR 90 MG/1
90 TABLET ORAL
Qty: 180 | Refills: 2 | Status: ACTIVE | COMMUNITY
Start: 2025-03-07 | End: 1900-01-01

## 2025-05-29 ENCOUNTER — NON-APPOINTMENT (OUTPATIENT)
Age: 60
End: 2025-05-29

## 2025-05-31 ENCOUNTER — NON-APPOINTMENT (OUTPATIENT)
Age: 60
End: 2025-05-31

## 2025-06-02 ENCOUNTER — APPOINTMENT (OUTPATIENT)
Dept: CARDIOLOGY | Facility: CLINIC | Age: 60
End: 2025-06-02
Payer: COMMERCIAL

## 2025-06-02 ENCOUNTER — RX RENEWAL (OUTPATIENT)
Age: 60
End: 2025-06-02

## 2025-06-02 PROCEDURE — 93306 TTE W/DOPPLER COMPLETE: CPT

## 2025-06-23 ENCOUNTER — APPOINTMENT (OUTPATIENT)
Dept: CARDIOLOGY | Facility: CLINIC | Age: 60
End: 2025-06-23

## 2025-06-23 VITALS
HEART RATE: 69 BPM | OXYGEN SATURATION: 99 % | DIASTOLIC BLOOD PRESSURE: 74 MMHG | HEIGHT: 72 IN | WEIGHT: 165 LBS | SYSTOLIC BLOOD PRESSURE: 110 MMHG | BODY MASS INDEX: 22.35 KG/M2

## 2025-06-23 PROCEDURE — 93000 ELECTROCARDIOGRAM COMPLETE: CPT

## 2025-06-23 PROCEDURE — 99204 OFFICE O/P NEW MOD 45 MIN: CPT

## 2025-06-23 PROCEDURE — G2211 COMPLEX E/M VISIT ADD ON: CPT | Mod: NC

## 2025-08-27 ENCOUNTER — RX RENEWAL (OUTPATIENT)
Age: 60
End: 2025-08-27